# Patient Record
Sex: MALE | Race: WHITE | NOT HISPANIC OR LATINO | Employment: FULL TIME | URBAN - METROPOLITAN AREA
[De-identification: names, ages, dates, MRNs, and addresses within clinical notes are randomized per-mention and may not be internally consistent; named-entity substitution may affect disease eponyms.]

---

## 2017-03-01 ENCOUNTER — APPOINTMENT (OUTPATIENT)
Dept: CT IMAGING | Facility: CLINIC | Age: 27
End: 2017-03-01
Attending: EMERGENCY MEDICINE
Payer: COMMERCIAL

## 2017-03-01 ENCOUNTER — HOSPITAL ENCOUNTER (EMERGENCY)
Facility: CLINIC | Age: 27
Discharge: HOME | End: 2017-03-01
Attending: EMERGENCY MEDICINE | Admitting: EMERGENCY MEDICINE
Payer: COMMERCIAL

## 2017-03-01 VITALS
RESPIRATION RATE: 14 BRPM | DIASTOLIC BLOOD PRESSURE: 96 MMHG | OXYGEN SATURATION: 97 % | HEIGHT: 77 IN | WEIGHT: 270 LBS | TEMPERATURE: 98 F | BODY MASS INDEX: 31.88 KG/M2 | HEART RATE: 80 BPM | SYSTOLIC BLOOD PRESSURE: 160 MMHG

## 2017-03-01 DIAGNOSIS — S12.501A CLOSED NONDISPLACED FRACTURE OF SIXTH CERVICAL VERTEBRA, UNSPECIFIED FRACTURE MORPHOLOGY, INITIAL ENCOUNTER (H): ICD-10-CM

## 2017-03-01 DIAGNOSIS — I10 ESSENTIAL HYPERTENSION: ICD-10-CM

## 2017-03-01 LAB
ANION GAP SERPL CALCULATED.3IONS-SCNC: 8 MMOL/L (ref 3–14)
BASOPHILS # BLD AUTO: 0.1 10E9/L (ref 0–0.2)
BASOPHILS NFR BLD AUTO: 0.8 %
BUN SERPL-MCNC: 12 MG/DL (ref 7–30)
CALCIUM SERPL-MCNC: 9.5 MG/DL (ref 8.5–10.1)
CHLORIDE SERPL-SCNC: 104 MMOL/L (ref 94–109)
CO2 SERPL-SCNC: 27 MMOL/L (ref 20–32)
CREAT SERPL-MCNC: 0.71 MG/DL (ref 0.66–1.25)
DIFFERENTIAL METHOD BLD: ABNORMAL
EOSINOPHIL # BLD AUTO: 0.1 10E9/L (ref 0–0.7)
EOSINOPHIL NFR BLD AUTO: 1.6 %
ERYTHROCYTE [DISTWIDTH] IN BLOOD BY AUTOMATED COUNT: 13.6 % (ref 10–15)
GFR SERPL CREATININE-BSD FRML MDRD: NORMAL ML/MIN/1.7M2
GLUCOSE SERPL-MCNC: 94 MG/DL (ref 70–99)
HCT VFR BLD AUTO: 44.5 % (ref 40–53)
HGB BLD-MCNC: 15.4 G/DL (ref 13.3–17.7)
IMM GRANULOCYTES # BLD: 0 10E9/L (ref 0–0.4)
IMM GRANULOCYTES NFR BLD: 0.2 %
LYMPHOCYTES # BLD AUTO: 2.3 10E9/L (ref 0.8–5.3)
LYMPHOCYTES NFR BLD AUTO: 27 %
MCH RBC QN AUTO: 30 PG (ref 26.5–33)
MCHC RBC AUTO-ENTMCNC: 34.6 G/DL (ref 31.5–36.5)
MCV RBC AUTO: 87 FL (ref 78–100)
MONOCYTES # BLD AUTO: 1.4 10E9/L (ref 0–1.3)
MONOCYTES NFR BLD AUTO: 16.8 %
NEUTROPHILS # BLD AUTO: 4.5 10E9/L (ref 1.6–8.3)
NEUTROPHILS NFR BLD AUTO: 53.6 %
PLATELET # BLD AUTO: 270 10E9/L (ref 150–450)
POTASSIUM SERPL-SCNC: 3.9 MMOL/L (ref 3.4–5.3)
RBC # BLD AUTO: 5.13 10E12/L (ref 4.4–5.9)
SODIUM SERPL-SCNC: 139 MMOL/L (ref 133–144)
WBC # BLD AUTO: 8.4 10E9/L (ref 4–11)

## 2017-03-01 PROCEDURE — 25500064 ZZH RX 255 OP 636: Performed by: EMERGENCY MEDICINE

## 2017-03-01 PROCEDURE — 25000128 H RX IP 250 OP 636: Performed by: EMERGENCY MEDICINE

## 2017-03-01 PROCEDURE — 80048 BASIC METABOLIC PNL TOTAL CA: CPT | Performed by: EMERGENCY MEDICINE

## 2017-03-01 PROCEDURE — 72125 CT NECK SPINE W/O DYE: CPT

## 2017-03-01 PROCEDURE — 25000125 ZZHC RX 250: Performed by: EMERGENCY MEDICINE

## 2017-03-01 PROCEDURE — 96374 THER/PROPH/DIAG INJ IV PUSH: CPT | Mod: 59

## 2017-03-01 PROCEDURE — 70450 CT HEAD/BRAIN W/O DYE: CPT

## 2017-03-01 PROCEDURE — 70498 CT ANGIOGRAPHY NECK: CPT

## 2017-03-01 PROCEDURE — 85025 COMPLETE CBC W/AUTO DIFF WBC: CPT | Performed by: EMERGENCY MEDICINE

## 2017-03-01 PROCEDURE — 96375 TX/PRO/DX INJ NEW DRUG ADDON: CPT

## 2017-03-01 PROCEDURE — 99285 EMERGENCY DEPT VISIT HI MDM: CPT | Mod: 25

## 2017-03-01 RX ORDER — MORPHINE SULFATE 4 MG/ML
4 INJECTION, SOLUTION INTRAMUSCULAR; INTRAVENOUS
Status: COMPLETED | OUTPATIENT
Start: 2017-03-01 | End: 2017-03-01

## 2017-03-01 RX ORDER — METHYLPREDNISOLONE 4 MG
TABLET, DOSE PACK ORAL
Qty: 21 TABLET | Refills: 0 | Status: SHIPPED | OUTPATIENT
Start: 2017-03-01 | End: 2017-03-08

## 2017-03-01 RX ORDER — DIAZEPAM 10 MG/2ML
5 INJECTION, SOLUTION INTRAMUSCULAR; INTRAVENOUS ONCE
Status: COMPLETED | OUTPATIENT
Start: 2017-03-01 | End: 2017-03-01

## 2017-03-01 RX ORDER — CYCLOBENZAPRINE HCL 10 MG
10 TABLET ORAL 3 TIMES DAILY PRN
Qty: 20 TABLET | Refills: 0 | Status: SHIPPED | OUTPATIENT
Start: 2017-03-01 | End: 2017-03-08

## 2017-03-01 RX ORDER — IOPAMIDOL 755 MG/ML
70 INJECTION, SOLUTION INTRAVASCULAR ONCE
Status: COMPLETED | OUTPATIENT
Start: 2017-03-01 | End: 2017-03-01

## 2017-03-01 RX ORDER — OXYCODONE HYDROCHLORIDE 5 MG/1
5 TABLET ORAL EVERY 6 HOURS PRN
Qty: 20 TABLET | Refills: 0 | Status: SHIPPED | OUTPATIENT
Start: 2017-03-01 | End: 2017-03-08

## 2017-03-01 RX ORDER — METHYLPREDNISOLONE SODIUM SUCCINATE 125 MG/2ML
125 INJECTION, POWDER, LYOPHILIZED, FOR SOLUTION INTRAMUSCULAR; INTRAVENOUS ONCE
Status: COMPLETED | OUTPATIENT
Start: 2017-03-01 | End: 2017-03-01

## 2017-03-01 RX ORDER — OXYCODONE HYDROCHLORIDE 5 MG/1
10 TABLET ORAL ONCE
Status: DISCONTINUED | OUTPATIENT
Start: 2017-03-01 | End: 2017-03-01 | Stop reason: HOSPADM

## 2017-03-01 RX ADMIN — SODIUM CHLORIDE 100 ML: 9 INJECTION, SOLUTION INTRAVENOUS at 19:06

## 2017-03-01 RX ADMIN — MORPHINE SULFATE 4 MG: 4 INJECTION, SOLUTION INTRAMUSCULAR; INTRAVENOUS at 17:22

## 2017-03-01 RX ADMIN — DIAZEPAM 5 MG: 5 INJECTION, SOLUTION INTRAMUSCULAR; INTRAVENOUS at 17:53

## 2017-03-01 RX ADMIN — IOPAMIDOL 70 ML: 755 INJECTION, SOLUTION INTRAVENOUS at 19:06

## 2017-03-01 RX ADMIN — METHYLPREDNISOLONE SODIUM SUCCINATE 125 MG: 125 INJECTION, POWDER, FOR SOLUTION INTRAMUSCULAR; INTRAVENOUS at 17:22

## 2017-03-01 ASSESSMENT — ENCOUNTER SYMPTOMS
BACK PAIN: 1
WEAKNESS: 1
NUMBNESS: 1
NECK PAIN: 1

## 2017-03-01 NOTE — ED AVS SNAPSHOT
Emergency Department    [address] [facility] [address]    Phone:  [phone]    Fax:  [phone]                                       Tao Castillo   MRN: [id number]    Department:   Emergency Department   Date of Visit:  3/1/2017           Patient Information     Date Of Birth          1990        Your diagnoses for this visit were:     Closed nondisplaced fracture of sixth cervical vertebra, unspecified fracture morphology, initial encounter (H)     Essential hypertension        You were seen by Boogie White MD and Tayo Swift MD.      Follow-up Information     Follow up with Brian Nix MD In 1 week.    Specialty:  Neurosurgery    Contact information:    [facility]  [address] [facility] [address]  [facility] [address] [phone]          Follow up with  Emergency Department.    Specialty:  EMERGENCY MEDICINE    Why:  As needed, If symptoms worsen    Contact information:    [address] [facility] [address] [phone]        Follow up with Danny Camejo MD In 1 week.    Specialties:  Family Practice, Occupational Medicine    Contact information:    [facility]  [address] [facility] [address]  [phone]          Discharge Instructions         Neck or Spine Fractures (Broken Neck or Spine)  The spine stretches from the base of the skull to the tailbone. It's composed of 33 bones (vertebrae) that help support the body. These bones also protect the spinal cord, the important branch of your nervous system that carries messages from the brain to the body. A broken (fractured) bone in the neck or spine can be very serious. In some cases it can lead to paralysis or death. Emergency care is crucial.  Keep neck and spine injuries still  Do not move a person with a neck or spine injury. The person should lie still and wait for an emergency medical team. It can help for someone to gently  hold the person's head to keep it from moving until help arrives.   When to go to the emergency room (ER)  If you come upon a person with a neck or spine injury, call for emergency help right away. Wait for emergency services personnel to arrive and take over. A person with a neck or spinal injury may have these symptoms may include:    Unconsciousness    Severe back or neck pain    Bruising and swelling over the neck or back    Tingling or loss of feeling in the hands or feet    Loss of bowel or bladder function    Loss of feeling and movement below the level of injury    Weakness or inability to move arms or legs  What to expect in the ER  Here is what will happen in the ER:     The injured person may be placed on a spine board that prevents movement for examination.    X-rays of the neck and/or spine may be taken     A computed tomography (CT) scan or magnetic resonance imaging (MRI) scan may be done. These provide more detailed images of the structures in the neck and back.    Medicine may be given to lessen pain.  Treatment  The goal of treatment is to return the neck or spine to its normal position.    A minor neck fracture or simple spine fracture may be treated with a neck brace for 6 to 8 weeks until the bone heals.    Severe or complex fractures often need surgery. In that case, a bone specialist (orthopaedic surgeon) or nerve specialist (neurosurgeon) may be needed.    0804-4972 [employer]. [address] [place] [address]. All rights reserved. This information is not intended as a substitute for professional medical care. Always follow your healthcare professional's instructions.      Discharge Instructions  Hypertension - High Blood Pressure    During you visit to the Emergency Department, your blood pressure was higher than the recommended blood pressure.  This may be related to stress, pain, medication or other temporary conditions. In these cases, your blood pressure may  return to normal on its own. If you have a history of high blood pressure, you may need to have your doctor adjust your medications. Sometimes, your high measurement here may indicate that you have developed high blood pressure that will stay high unless it is treated. Sudden very high blood pressure can cause problems, but usually high blood pressure causes problems over months to years.      Blood pressure is almost never lowered in the Emergency Department, because studies have shown that lowering blood pressure too quickly is much more dangerous than leaving it alone.    You need to follow up with your doctor in 1-3 days to get your blood pressure rechecked.     Return to the Emergency Department if you start to have:    A severe headache.    Chest pain.    Shortness of breath.    Weakness or numbness that affects one part of the body.    Confusion.    Vision changes.    Significant swelling of legs and/or eyes.    A reaction to any medication started in the Emergency Department.    What can I do to help myself?    Avoid alcohol.    Take any blood pressure medicine that you are prescribed.    Get a good night s sleep.    Lower your salt intake.    Exercise.    Lose weight.    Manage stress.    If blood pressure medication was started in the Emergency Department:    The medicine may not have an immediate effect. The body and brain determine what blood pressure you have. The medicine s job is to retrain the body s  thermostat  to a lower blood pressure.    You will need to follow up with your doctor to see how this medicine is working for you.  If you were given a prescription for medicine here today, be sure to read all of the information (including the package insert) that comes with your prescription.  This will include important information about the medicine, its side effects, and any warnings that you need to know about.  The pharmacist who fills the prescription can provide more information and answer  questions you may have about the medicine.  If you have questions or concerns that the pharmacist cannot address, please call or return to the Emergency Department.   Opioid Medication Information    Pain medications are among the most commonly prescribed medicines, so we are including this information for all our patients. If you did not receive pain medication or get a prescription for pain medicine, you can ignore it.     You may have been given a prescription for an opioid (narcotic) pain medicine and/or have received a pain medicine while here in the Emergency Department. These medicines can make you drowsy or impaired. You must not drive, operate dangerous equipment, or engage in any other dangerous activities while taking these medications. If you drive while taking these medications, you could be arrested for DUI, or driving under the influence. Do not drink any alcohol while you are taking these medications.     Opioid pain medications can cause addiction. If you have a history of chemical dependency of any type, you are at a higher risk of becoming addicted to pain medications.  Only take these prescribed medications to treat your pain when all other options have been tried. Take it for as short a time and as few doses as possible. Store your pain pills in a secure place, as they are frequently stolen and provide a dangerous opportunity for children or visitors in your house to start abusing these powerful medications. We will not replace any lost or stolen medicine.  As soon as your pain is better, you should flush all your remaining medication.     Many prescription pain medications contain Tylenol  (acetaminophen), including Vicodin , Tylenol #3 , Norco , Lortab , and Percocet .  You should not take any extra pills of Tylenol  if you are using these prescription medications or you can get very sick.  Do not ever take more than 3000 mg of acetaminophen in any 24 hour period.    All opioids tend to cause  constipation. Drink plenty of water and eat foods that have a lot of fiber, such as fruits, vegetables, prune juice, apple juice and high fiber cereal.  Take a laxative if you don t move your bowels at least every other day. Miralax , Milk of Magnesia, Colace , or Senna  can be used to keep you regular.      Remember that you can always come back to the Emergency Department if you are not able to see your regular doctor in the amount of time listed above, if you get any new symptoms, or if there is anything that worries you.      24 Hour Appointment Hotline       To make an appointment at any [facility], call [phone] ([phone]). If you don't have a family doctor or clinic, we will help you find one. [facility] are conveniently located to serve the needs of you and your family.             Review of your medicines      START taking        Dose / Directions Last dose taken    cyclobenzaprine 10 MG tablet   Commonly known as:  FLEXERIL   Dose:  10 mg   Quantity:  20 tablet        Take 1 tablet (10 mg) by mouth 3 times daily as needed for muscle spasms   Refills:  0        methylPREDNISolone 4 MG tablet   Commonly known as:  MEDROL DOSEPAK   Quantity:  21 tablet        Follow package instructions   Refills:  0        oxyCODONE 5 MG IR tablet   Commonly known as:  ROXICODONE   Dose:  5 mg   Quantity:  20 tablet        Take 1 tablet (5 mg) by mouth every 6 hours as needed for pain   Refills:  0          Our records show that you are taking the medicines listed below. If these are incorrect, please call your family doctor or clinic.        Dose / Directions Last dose taken    atenolol 100 MG tablet   Commonly known as:  TENORMIN   Dose:  100 mg        Take 100 mg by mouth daily   Refills:  0        losartan 100 MG tablet   Commonly known as:  COZAAR   Dose:  100 mg        Take 100 mg by mouth daily   Refills:  0          STOP taking        Dose Reason for stopping Comments    ciprofloxacin 500 MG  tablet   Commonly known as:  CIPRO                      Prescriptions were sent or printed at these locations (3 Prescriptions)                   Other Prescriptions                Printed at Department/Unit printer (3 of 3)         oxyCODONE (ROXICODONE) 5 MG IR tablet               cyclobenzaprine (FLEXERIL) 10 MG tablet               methylPREDNISolone (MEDROL DOSEPAK) 4 MG tablet                Procedures and tests performed during your visit     Basic metabolic panel    CBC with platelets differential    CT Cervical Spine w/o Contrast    CT Head w/o Contrast    Neck angiogram CT w & w/o contrast      Orders Needing Specimen Collection     None      Pending Results     Date and Time Order Name Status Description    3/1/2017 1750 Neck angiogram CT w & w/o contrast In process     3/1/2017 1750 CT Cervical Spine w/o Contrast Preliminary     3/1/2017 1750 CT Head w/o Contrast Preliminary             Pending Culture Results     No orders found from 2/27/2017 to 3/2/2017.             Test Results from your hospital stay     3/1/2017  5:16 PM - Interface, PushCoin Results      Component Results     Component Value Ref Range & Units Status    WBC 8.4 4.0 - 11.0 10e9/L Final    RBC Count 5.13 4.4 - 5.9 10e12/L Final    Hemoglobin 15.4 13.3 - 17.7 g/dL Final    Hematocrit 44.5 40.0 - 53.0 % Final    MCV 87 78 - 100 fl Final    MCH 30.0 26.5 - 33.0 pg Final    MCHC 34.6 31.5 - 36.5 g/dL Final    RDW 13.6 10.0 - 15.0 % Final    Platelet Count 270 150 - 450 10e9/L Final    Diff Method Automated Method  Final    % Neutrophils 53.6 % Final    % Lymphocytes 27.0 % Final    % Monocytes 16.8 % Final    % Eosinophils 1.6 % Final    % Basophils 0.8 % Final    % Immature Granulocytes 0.2 % Final    Absolute Neutrophil 4.5 1.6 - 8.3 10e9/L Final    Absolute Lymphocytes 2.3 0.8 - 5.3 10e9/L Final    Absolute Monocytes 1.4 (H) 0.0 - 1.3 10e9/L Final    Absolute Eosinophils 0.1 0.0 - 0.7 10e9/L Final    Absolute Basophils 0.1 0.0 - 0.2  10e9/L Final    Abs Immature Granulocytes 0.0 0 - 0.4 10e9/L Final         3/1/2017  5:34 PM - Interface, Flexilab Results      Component Results     Component Value Ref Range & Units Status    Sodium 139 133 - 144 mmol/L Final    Potassium 3.9 3.4 - 5.3 mmol/L Final    Chloride 104 94 - 109 mmol/L Final    Carbon Dioxide 27 20 - 32 mmol/L Final    Anion Gap 8 3 - 14 mmol/L Final    Glucose 94 70 - 99 mg/dL Final    Urea Nitrogen 12 7 - 30 mg/dL Final    Creatinine 0.71 0.66 - 1.25 mg/dL Final    GFR Estimate >90  Non  GFR Calc   >60 mL/min/1.7m2 Final    GFR Estimate If Black >90   GFR Calc   >60 mL/min/1.7m2 Final    Calcium 9.5 8.5 - 10.1 mg/dL Final         3/1/2017  7:09 PM - Interface, Radiant Ib      Narrative     CT HEAD WITHOUT CONTRAST  3/1/2017 7:04 PM    HISTORY: Left-sided headache and neck pain after injury this weekend.  Prior aneurysm.    TECHNIQUE: Scans were obtained through the head without IV contrast.  Radiation dose for this scan was reduced using automated exposure  control, adjustment of the mA and/or kV according to patient size, or  iterative reconstruction technique.    COMPARISON: 5/13/2011 head CT.    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 This causes  some local artifact. No hemorrhage, mass lesion, or focal area of  acute infarction identified. Paranasal sinuses are normal. Prior right  temporal craniotomy for treatment of the aneurysm. Patient was  immediate postoperative at the time of the prior study and the  postoperative right-sided pneumocephalus has resolved in the interval.        Impression     IMPRESSION:   1. Prior aneurysm clipping as described.  2. Nothing acute.         3/1/2017  7:20 PM - Interface, Radiant Ib      Narrative     CT CERVICAL SPINE WITHOUT CONTRAST 3/1/2017 7:04 PM    HISTORY: Left-sided headache and neck pain after trauma. Prior  aneurysm.    COMPARISON: None.    TECHNIQUE: Routine CT cervical spine  through T1. Radiation dose for  this scan was reduced using automated exposure control, adjustment of  the mA and/or kV according to patient size, or iterative  reconstruction technique.    FINDINGS: Alignment is normal through T1. There is a linear lucency  running obliquely through the left C6 superior articular process on  sagittal series 7 image 63 and 64. There are no similar lucencies  elsewhere and this likely represents a nondisplaced fracture. This was  discussed with Dr. Swift by phone at 7:15 PM. The adjacent vertebral  artery appears intact on the CT angiogram preliminary view.    No degenerative changes in the cervical spine. Remainder of the exam  is normal.        Impression     IMPRESSION:   1. Apparent nondisplaced fracture of the left C6 superior articular  process.  2. [Critical Result: Left C6 superior articular process fracture.]    Finding was identified on 3/1/2017 7:08 PM.     Dr. Swift was contacted by me on 3/1/2017 7:17 PM and verbalized  understanding of the critical result.          3/1/2017  6:48 PM - Homer Collado      Result not yet available     Exam Ended                Clinical Quality Measure: Blood Pressure Screening     Your blood pressure was checked while you were in the emergency department today. The last reading we obtained was  BP: (!) 160/96 . Please read the guidelines below about what these numbers mean and what you should do about them.  If your systolic blood pressure (the top number) is less than 120 and your diastolic blood pressure (the bottom number) is less than 80, then your blood pressure is normal. There is nothing more that you need to do about it.  If your systolic blood pressure (the top number) is 120-139 or your diastolic blood pressure (the bottom number) is 80-89, your blood pressure may be higher than it should be. You should have your blood pressure rechecked within a year by a primary care provider.  If your systolic blood pressure (the top  "number) is 140 or greater or your diastolic blood pressure (the bottom number) is 90 or greater, you may have high blood pressure. High blood pressure is treatable, but if left untreated over time it can put you at risk for heart attack, stroke, or kidney failure. You should have your blood pressure rechecked by a primary care provider within the next 4 weeks.  If your provider in the emergency department today gave you specific instructions to follow-up with your doctor or provider even sooner than that, you should follow that instruction and not wait for up to 4 weeks for your follow-up visit.        Thank you for choosing [facility]       Thank you for choosing [facility] for your care. Our goal is always to provide you with excellent care. Hearing back from our patients is one way we can continue to improve our services. Please take a few minutes to complete the written survey that you may receive in the mail after you visit with us. Thank you!        [email or web address] Information     [employer] lets you send messages to your doctor, view your test results, renew your prescriptions, schedule appointments and more. To sign up, go to [email or web address] . Click on \"Log in\" on the left side of the screen, which will take you to the Welcome page. Then click on \"Sign up Now\" on the right side of the page.     You will be asked to enter the access code listed below, as well as some personal information. Please follow the directions to create your username and password.     Your access code is: [email or web address]  Expires: 2017  5:07 PM     Your access code will  in 90 days. If you need help or a new code, please call your [facility] clinic or [phone].        Care EveryWhere ID     This is your Care EveryWhere ID. This could be used by other organizations to access your [facility] medical records  [id number]        After Visit Summary       This is your record. Keep this with you and show to your community pharmacist(s) and " doctor(s) at your next visit.

## 2017-03-01 NOTE — LETTER
EMERGENCY DEPARTMENT  [address] [facility] [address]  [phone]    2017    Tao Castillo  [address] [facility] [address]  [phone] (home)     : 1990      To Whom it may concern:    Tao Castillo was seen in our Emergency Department today, 2017.  I expect his condition to improve over the next 7 days.  He may return to work/school when improved.    Sincerely,        Tayo Jackson Trigger
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
## 2017-03-01 NOTE — ED AVS SNAPSHOT
Emergency Department    [address] [place] [address]    Phone:  [phone]    Fax:  [phone]                                       Tao Castillo   MRN: [id number]    Department:   Emergency Department   Date of Visit:  3/1/2017           After Visit Summary Signature Page     I have received my discharge instructions, and my questions have been answered. I have discussed any challenges I see with this plan with the nurse or doctor.    ..........................................................................................................................................  Patient/Patient Representative Signature      ..........................................................................................................................................  Patient Representative Print Name and Relationship to Patient    ..................................................               ................................................  Date                                            Time    ..........................................................................................................................................  Reviewed by Signature/Title    ...................................................              ..............................................  Date                                                            Time

## 2017-03-02 NOTE — DISCHARGE INSTRUCTIONS
Neck or Spine Fractures (Broken Neck or Spine)  The spine stretches from the base of the skull to the tailbone. It's composed of 33 bones (vertebrae) that help support the body. These bones also protect the spinal cord, the important branch of your nervous system that carries messages from the brain to the body. A broken (fractured) bone in the neck or spine can be very serious. In some cases it can lead to paralysis or death. Emergency care is crucial.  Keep neck and spine injuries still  Do not move a person with a neck or spine injury. The person should lie still and wait for an emergency medical team. It can help for someone to gently hold the person's head to keep it from moving until help arrives.   When to go to the emergency room (ER)  If you come upon a person with a neck or spine injury, call for emergency help right away. Wait for emergency services personnel to arrive and take over. A person with a neck or spinal injury may have these symptoms may include:    Unconsciousness    Severe back or neck pain    Bruising and swelling over the neck or back    Tingling or loss of feeling in the hands or feet    Loss of bowel or bladder function    Loss of feeling and movement below the level of injury    Weakness or inability to move arms or legs  What to expect in the ER  Here is what will happen in the ER:     The injured person may be placed on a spine board that prevents movement for examination.    X-rays of the neck and/or spine may be taken     A computed tomography (CT) scan or magnetic resonance imaging (MRI) scan may be done. These provide more detailed images of the structures in the neck and back.    Medicine may be given to lessen pain.  Treatment  The goal of treatment is to return the neck or spine to its normal position.    A minor neck fracture or simple spine fracture may be treated with a neck brace for 6 to 8 weeks until the bone heals.    Severe or complex fractures often need surgery. In  that case, a bone specialist (orthopaedic surgeon) or nerve specialist (neurosurgeon) may be needed.    8026-5737 [employer]. [address] [place] [address], [place] [address]. All rights reserved. This information is not intended as a substitute for professional medical care. Always follow your healthcare professional's instructions.      Discharge Instructions  Hypertension - High Blood Pressure    During you visit to the Emergency Department, your blood pressure was higher than the recommended blood pressure.  This may be related to stress, pain, medication or other temporary conditions. In these cases, your blood pressure may return to normal on its own. If you have a history of high blood pressure, you may need to have your doctor adjust your medications. Sometimes, your high measurement here may indicate that you have developed high blood pressure that will stay high unless it is treated. Sudden very high blood pressure can cause problems, but usually high blood pressure causes problems over months to years.      Blood pressure is almost never lowered in the Emergency Department, because studies have shown that lowering blood pressure too quickly is much more dangerous than leaving it alone.    You need to follow up with your doctor in 1-3 days to get your blood pressure rechecked.     Return to the Emergency Department if you start to have:    A severe headache.    Chest pain.    Shortness of breath.    Weakness or numbness that affects one part of the body.    Confusion.    Vision changes.    Significant swelling of legs and/or eyes.    A reaction to any medication started in the Emergency Department.    What can I do to help myself?    Avoid alcohol.    Take any blood pressure medicine that you are prescribed.    Get a good night s sleep.    Lower your salt intake.    Exercise.    Lose weight.    Manage stress.    If blood pressure medication was started in the Emergency Department:    The medicine  may not have an immediate effect. The body and brain determine what blood pressure you have. The medicine s job is to retrain the body s  thermostat  to a lower blood pressure.    You will need to follow up with your doctor to see how this medicine is working for you.  If you were given a prescription for medicine here today, be sure to read all of the information (including the package insert) that comes with your prescription.  This will include important information about the medicine, its side effects, and any warnings that you need to know about.  The pharmacist who fills the prescription can provide more information and answer questions you may have about the medicine.  If you have questions or concerns that the pharmacist cannot address, please call or return to the Emergency Department.   Opioid Medication Information    Pain medications are among the most commonly prescribed medicines, so we are including this information for all our patients. If you did not receive pain medication or get a prescription for pain medicine, you can ignore it.     You may have been given a prescription for an opioid (narcotic) pain medicine and/or have received a pain medicine while here in the Emergency Department. These medicines can make you drowsy or impaired. You must not drive, operate dangerous equipment, or engage in any other dangerous activities while taking these medications. If you drive while taking these medications, you could be arrested for DUI, or driving under the influence. Do not drink any alcohol while you are taking these medications.     Opioid pain medications can cause addiction. If you have a history of chemical dependency of any type, you are at a higher risk of becoming addicted to pain medications.  Only take these prescribed medications to treat your pain when all other options have been tried. Take it for as short a time and as few doses as possible. Store your pain pills in a secure place, as  they are frequently stolen and provide a dangerous opportunity for children or visitors in your house to start abusing these powerful medications. We will not replace any lost or stolen medicine.  As soon as your pain is better, you should flush all your remaining medication.     Many prescription pain medications contain Tylenol  (acetaminophen), including Vicodin , Tylenol #3 , Norco , Lortab , and Percocet .  You should not take any extra pills of Tylenol  if you are using these prescription medications or you can get very sick.  Do not ever take more than 3000 mg of acetaminophen in any 24 hour period.    All opioids tend to cause constipation. Drink plenty of water and eat foods that have a lot of fiber, such as fruits, vegetables, prune juice, apple juice and high fiber cereal.  Take a laxative if you don t move your bowels at least every other day. Miralax , Milk of Magnesia, Colace , or Senna  can be used to keep you regular.      Remember that you can always come back to the Emergency Department if you are not able to see your regular doctor in the amount of time listed above, if you get any new symptoms, or if there is anything that worries you.

## 2017-03-08 ENCOUNTER — APPOINTMENT (OUTPATIENT)
Dept: CT IMAGING | Facility: CLINIC | Age: 27
DRG: 897 | End: 2017-03-08
Payer: COMMERCIAL

## 2017-03-08 ENCOUNTER — HOSPITAL ENCOUNTER (INPATIENT)
Facility: CLINIC | Age: 27
LOS: 2 days | Discharge: ANOTHER HOSPITAL | DRG: 897 | End: 2017-03-10
Attending: EMERGENCY MEDICINE | Admitting: INTERNAL MEDICINE
Payer: COMMERCIAL

## 2017-03-08 DIAGNOSIS — F10.930 ALCOHOL WITHDRAWAL, UNCOMPLICATED (H): ICD-10-CM

## 2017-03-08 DIAGNOSIS — I16.0 HYPERTENSIVE URGENCY: ICD-10-CM

## 2017-03-08 DIAGNOSIS — S12.500D: ICD-10-CM

## 2017-03-08 DIAGNOSIS — S12.500S: Primary | ICD-10-CM

## 2017-03-08 PROBLEM — F10.931 SEVERE ALCOHOL WITHDRAWAL DELIRIUM (H): Status: ACTIVE | Noted: 2017-03-08

## 2017-03-08 LAB
ALBUMIN SERPL-MCNC: 3.8 G/DL (ref 3.4–5)
ALCOHOL BREATH TEST: 0.06 (ref 0–0.01)
ALP SERPL-CCNC: 60 U/L (ref 40–150)
ALT SERPL W P-5'-P-CCNC: 51 U/L (ref 0–70)
AMPHETAMINES UR QL SCN: ABNORMAL
ANION GAP SERPL CALCULATED.3IONS-SCNC: 9 MMOL/L (ref 3–14)
AST SERPL W P-5'-P-CCNC: 44 U/L (ref 0–45)
BARBITURATES UR QL: ABNORMAL
BENZODIAZ UR QL: ABNORMAL
BILIRUB SERPL-MCNC: 0.6 MG/DL (ref 0.2–1.3)
BUN SERPL-MCNC: 12 MG/DL (ref 7–30)
CALCIUM SERPL-MCNC: 8.8 MG/DL (ref 8.5–10.1)
CANNABINOIDS UR QL SCN: ABNORMAL
CHLORIDE SERPL-SCNC: 103 MMOL/L (ref 94–109)
CO2 SERPL-SCNC: 24 MMOL/L (ref 20–32)
COCAINE UR QL: ABNORMAL
CREAT SERPL-MCNC: 0.59 MG/DL (ref 0.66–1.25)
ETHANOL UR QL SCN: ABNORMAL
GFR SERPL CREATININE-BSD FRML MDRD: ABNORMAL ML/MIN/1.7M2
GLUCOSE BLDC GLUCOMTR-MCNC: 88 MG/DL (ref 70–99)
GLUCOSE SERPL-MCNC: 77 MG/DL (ref 70–99)
MAGNESIUM SERPL-MCNC: 2.1 MG/DL (ref 1.6–2.3)
OPIATES UR QL SCN: ABNORMAL
PCP UR QL SCN: ABNORMAL
PHOSPHATE SERPL-MCNC: 3.5 MG/DL (ref 2.5–4.5)
POTASSIUM SERPL-SCNC: 3.8 MMOL/L (ref 3.4–5.3)
PROT SERPL-MCNC: 8.3 G/DL (ref 6.8–8.8)
SODIUM SERPL-SCNC: 136 MMOL/L (ref 133–144)

## 2017-03-08 PROCEDURE — 25000132 ZZH RX MED GY IP 250 OP 250 PS 637: Performed by: EMERGENCY MEDICINE

## 2017-03-08 PROCEDURE — 96374 THER/PROPH/DIAG INJ IV PUSH: CPT | Performed by: EMERGENCY MEDICINE

## 2017-03-08 PROCEDURE — 25000128 H RX IP 250 OP 636: Performed by: STUDENT IN AN ORGANIZED HEALTH CARE EDUCATION/TRAINING PROGRAM

## 2017-03-08 PROCEDURE — 99285 EMERGENCY DEPT VISIT HI MDM: CPT | Performed by: EMERGENCY MEDICINE

## 2017-03-08 PROCEDURE — 82075 ASSAY OF BREATH ETHANOL: CPT | Performed by: EMERGENCY MEDICINE

## 2017-03-08 PROCEDURE — 84100 ASSAY OF PHOSPHORUS: CPT | Performed by: STUDENT IN AN ORGANIZED HEALTH CARE EDUCATION/TRAINING PROGRAM

## 2017-03-08 PROCEDURE — 80307 DRUG TEST PRSMV CHEM ANLYZR: CPT | Performed by: STUDENT IN AN ORGANIZED HEALTH CARE EDUCATION/TRAINING PROGRAM

## 2017-03-08 PROCEDURE — 00000146 ZZHCL STATISTIC GLUCOSE BY METER IP

## 2017-03-08 PROCEDURE — 83735 ASSAY OF MAGNESIUM: CPT | Performed by: STUDENT IN AN ORGANIZED HEALTH CARE EDUCATION/TRAINING PROGRAM

## 2017-03-08 PROCEDURE — 80053 COMPREHEN METABOLIC PANEL: CPT | Performed by: STUDENT IN AN ORGANIZED HEALTH CARE EDUCATION/TRAINING PROGRAM

## 2017-03-08 PROCEDURE — 25000132 ZZH RX MED GY IP 250 OP 250 PS 637: Performed by: INTERNAL MEDICINE

## 2017-03-08 PROCEDURE — 12000001 ZZH R&B MED SURG/OB UMMC

## 2017-03-08 PROCEDURE — 99285 EMERGENCY DEPT VISIT HI MDM: CPT | Mod: Z6 | Performed by: EMERGENCY MEDICINE

## 2017-03-08 PROCEDURE — 25000132 ZZH RX MED GY IP 250 OP 250 PS 637: Performed by: STUDENT IN AN ORGANIZED HEALTH CARE EDUCATION/TRAINING PROGRAM

## 2017-03-08 PROCEDURE — 96361 HYDRATE IV INFUSION ADD-ON: CPT | Performed by: EMERGENCY MEDICINE

## 2017-03-08 PROCEDURE — 25000128 H RX IP 250 OP 636: Performed by: EMERGENCY MEDICINE

## 2017-03-08 PROCEDURE — 25000125 ZZHC RX 250: Performed by: INTERNAL MEDICINE

## 2017-03-08 PROCEDURE — HZ2ZZZZ DETOXIFICATION SERVICES FOR SUBSTANCE ABUSE TREATMENT: ICD-10-PCS | Performed by: INTERNAL MEDICINE

## 2017-03-08 PROCEDURE — 80320 DRUG SCREEN QUANTALCOHOLS: CPT | Performed by: STUDENT IN AN ORGANIZED HEALTH CARE EDUCATION/TRAINING PROGRAM

## 2017-03-08 PROCEDURE — 72125 CT NECK SPINE W/O DYE: CPT

## 2017-03-08 PROCEDURE — 99207 ZZC MOONLIGHTING INDICATOR: CPT | Performed by: INTERNAL MEDICINE

## 2017-03-08 RX ORDER — OXYCODONE HCL 10 MG/1
10 TABLET, FILM COATED, EXTENDED RELEASE ORAL ONCE
Status: DISCONTINUED | OUTPATIENT
Start: 2017-03-08 | End: 2017-03-08

## 2017-03-08 RX ORDER — ACETAMINOPHEN 325 MG/1
650 TABLET ORAL EVERY 4 HOURS PRN
Status: DISCONTINUED | OUTPATIENT
Start: 2017-03-08 | End: 2017-03-10 | Stop reason: HOSPADM

## 2017-03-08 RX ORDER — ONDANSETRON 2 MG/ML
4 INJECTION INTRAMUSCULAR; INTRAVENOUS EVERY 6 HOURS PRN
Status: DISCONTINUED | OUTPATIENT
Start: 2017-03-08 | End: 2017-03-10 | Stop reason: HOSPADM

## 2017-03-08 RX ORDER — POTASSIUM CHLORIDE 750 MG/1
20-40 TABLET, EXTENDED RELEASE ORAL
Status: DISCONTINUED | OUTPATIENT
Start: 2017-03-08 | End: 2017-03-10 | Stop reason: HOSPADM

## 2017-03-08 RX ORDER — OXYCODONE HYDROCHLORIDE 5 MG/1
10 TABLET ORAL ONCE
Status: COMPLETED | OUTPATIENT
Start: 2017-03-08 | End: 2017-03-08

## 2017-03-08 RX ORDER — POTASSIUM CHLORIDE 7.45 MG/ML
10 INJECTION INTRAVENOUS
Status: DISCONTINUED | OUTPATIENT
Start: 2017-03-08 | End: 2017-03-10 | Stop reason: HOSPADM

## 2017-03-08 RX ORDER — MULTIPLE VITAMINS W/ MINERALS TAB 9MG-400MCG
1 TAB ORAL DAILY
Status: DISCONTINUED | OUTPATIENT
Start: 2017-03-08 | End: 2017-03-08

## 2017-03-08 RX ORDER — ONDANSETRON 4 MG/1
4 TABLET, ORALLY DISINTEGRATING ORAL EVERY 6 HOURS PRN
Status: DISCONTINUED | OUTPATIENT
Start: 2017-03-08 | End: 2017-03-10 | Stop reason: HOSPADM

## 2017-03-08 RX ORDER — BISACODYL 5 MG
5-15 TABLET, DELAYED RELEASE (ENTERIC COATED) ORAL DAILY PRN
Status: DISCONTINUED | OUTPATIENT
Start: 2017-03-08 | End: 2017-03-10 | Stop reason: HOSPADM

## 2017-03-08 RX ORDER — NICOTINE 21 MG/24HR
1 PATCH, TRANSDERMAL 24 HOURS TRANSDERMAL DAILY
Status: DISCONTINUED | OUTPATIENT
Start: 2017-03-09 | End: 2017-03-10 | Stop reason: HOSPADM

## 2017-03-08 RX ORDER — DIAZEPAM 5 MG
5-20 TABLET ORAL EVERY 30 MIN PRN
Status: DISCONTINUED | OUTPATIENT
Start: 2017-03-08 | End: 2017-03-10 | Stop reason: HOSPADM

## 2017-03-08 RX ORDER — LANOLIN ALCOHOL/MO/W.PET/CERES
100 CREAM (GRAM) TOPICAL DAILY
Status: DISCONTINUED | OUTPATIENT
Start: 2017-03-08 | End: 2017-03-08

## 2017-03-08 RX ORDER — MULTIPLE VITAMINS W/ MINERALS TAB 9MG-400MCG
1 TAB ORAL DAILY
Status: DISCONTINUED | OUTPATIENT
Start: 2017-03-09 | End: 2017-03-10 | Stop reason: HOSPADM

## 2017-03-08 RX ORDER — POTASSIUM CHLORIDE 29.8 MG/ML
20 INJECTION INTRAVENOUS
Status: DISCONTINUED | OUTPATIENT
Start: 2017-03-08 | End: 2017-03-10 | Stop reason: HOSPADM

## 2017-03-08 RX ORDER — IBUPROFEN 200 MG
200 TABLET ORAL EVERY 6 HOURS PRN
Status: DISCONTINUED | OUTPATIENT
Start: 2017-03-08 | End: 2017-03-08

## 2017-03-08 RX ORDER — LANOLIN ALCOHOL/MO/W.PET/CERES
100 CREAM (GRAM) TOPICAL DAILY
Status: DISCONTINUED | OUTPATIENT
Start: 2017-03-09 | End: 2017-03-10 | Stop reason: HOSPADM

## 2017-03-08 RX ORDER — LORAZEPAM 2 MG/ML
1 INJECTION INTRAMUSCULAR ONCE
Status: COMPLETED | OUTPATIENT
Start: 2017-03-08 | End: 2017-03-08

## 2017-03-08 RX ORDER — IBUPROFEN 200 MG
400 TABLET ORAL EVERY 6 HOURS PRN
Status: DISCONTINUED | OUTPATIENT
Start: 2017-03-08 | End: 2017-03-08

## 2017-03-08 RX ORDER — DEXTROSE MONOHYDRATE, SODIUM CHLORIDE, AND POTASSIUM CHLORIDE 50; 1.49; 9 G/1000ML; G/1000ML; G/1000ML
INJECTION, SOLUTION INTRAVENOUS CONTINUOUS
Status: DISCONTINUED | OUTPATIENT
Start: 2017-03-08 | End: 2017-03-10 | Stop reason: HOSPADM

## 2017-03-08 RX ORDER — POTASSIUM CHLORIDE 1.5 G/1.58G
20-40 POWDER, FOR SOLUTION ORAL
Status: DISCONTINUED | OUTPATIENT
Start: 2017-03-08 | End: 2017-03-10 | Stop reason: HOSPADM

## 2017-03-08 RX ORDER — NALOXONE HYDROCHLORIDE 0.4 MG/ML
.1-.4 INJECTION, SOLUTION INTRAMUSCULAR; INTRAVENOUS; SUBCUTANEOUS
Status: DISCONTINUED | OUTPATIENT
Start: 2017-03-08 | End: 2017-03-10 | Stop reason: HOSPADM

## 2017-03-08 RX ORDER — OXYCODONE HYDROCHLORIDE 5 MG/1
5-10 TABLET ORAL
Status: DISCONTINUED | OUTPATIENT
Start: 2017-03-08 | End: 2017-03-10 | Stop reason: HOSPADM

## 2017-03-08 RX ORDER — FOLIC ACID 1 MG/1
1 TABLET ORAL DAILY
Status: DISCONTINUED | OUTPATIENT
Start: 2017-03-08 | End: 2017-03-08

## 2017-03-08 RX ORDER — FOLIC ACID 1 MG/1
1 TABLET ORAL DAILY
Status: DISCONTINUED | OUTPATIENT
Start: 2017-03-09 | End: 2017-03-10 | Stop reason: HOSPADM

## 2017-03-08 RX ORDER — MAGNESIUM SULFATE HEPTAHYDRATE 40 MG/ML
4 INJECTION, SOLUTION INTRAVENOUS EVERY 4 HOURS PRN
Status: DISCONTINUED | OUTPATIENT
Start: 2017-03-08 | End: 2017-03-10 | Stop reason: HOSPADM

## 2017-03-08 RX ORDER — CYCLOBENZAPRINE HCL 10 MG
10 TABLET ORAL 3 TIMES DAILY
Status: DISCONTINUED | OUTPATIENT
Start: 2017-03-08 | End: 2017-03-10 | Stop reason: HOSPADM

## 2017-03-08 RX ORDER — ATENOLOL 50 MG/1
50 TABLET ORAL 2 TIMES DAILY PRN
Status: DISCONTINUED | OUTPATIENT
Start: 2017-03-08 | End: 2017-03-10 | Stop reason: HOSPADM

## 2017-03-08 RX ORDER — LORAZEPAM 1 MG/1
1-4 TABLET ORAL EVERY 30 MIN PRN
Status: DISCONTINUED | OUTPATIENT
Start: 2017-03-08 | End: 2017-03-08

## 2017-03-08 RX ADMIN — SODIUM CHLORIDE 1000 ML: 9 INJECTION, SOLUTION INTRAVENOUS at 17:28

## 2017-03-08 RX ADMIN — MULTIPLE VITAMINS W/ MINERALS TAB 1 TABLET: TAB at 15:39

## 2017-03-08 RX ADMIN — CYCLOBENZAPRINE HYDROCHLORIDE 10 MG: 10 TABLET, FILM COATED ORAL at 22:12

## 2017-03-08 RX ADMIN — LORAZEPAM 2 MG: 1 TABLET ORAL at 15:15

## 2017-03-08 RX ADMIN — IBUPROFEN 200 MG: 200 TABLET, FILM COATED ORAL at 15:39

## 2017-03-08 RX ADMIN — FOLIC ACID: 5 INJECTION, SOLUTION INTRAMUSCULAR; INTRAVENOUS; SUBCUTANEOUS at 22:12

## 2017-03-08 RX ADMIN — DIAZEPAM 10 MG: 5 TABLET ORAL at 22:12

## 2017-03-08 RX ADMIN — FOLIC ACID 1 MG: 1 TABLET ORAL at 15:39

## 2017-03-08 RX ADMIN — SODIUM CHLORIDE 1000 ML: 9 INJECTION, SOLUTION INTRAVENOUS at 19:06

## 2017-03-08 RX ADMIN — LORAZEPAM 3 MG: 1 TABLET ORAL at 19:29

## 2017-03-08 RX ADMIN — LORAZEPAM 1 MG: 2 INJECTION INTRAMUSCULAR; INTRAVENOUS at 20:35

## 2017-03-08 RX ADMIN — OXYCODONE HYDROCHLORIDE 10 MG: 5 TABLET ORAL at 22:12

## 2017-03-08 RX ADMIN — OXYCODONE HYDROCHLORIDE 10 MG: 5 TABLET ORAL at 17:28

## 2017-03-08 RX ADMIN — LORAZEPAM 2 MG: 1 TABLET ORAL at 17:08

## 2017-03-08 RX ADMIN — Medication 100 MG: at 15:39

## 2017-03-08 ASSESSMENT — ENCOUNTER SYMPTOMS
CONFUSION: 0
ABDOMINAL PAIN: 0
DIARRHEA: 0
NUMBNESS: 1
DIAPHORESIS: 1
SHORTNESS OF BREATH: 0
CONSTIPATION: 0
ARTHRALGIAS: 0
EYE REDNESS: 0
SPEECH DIFFICULTY: 0
HEADACHES: 0
NECK STIFFNESS: 0
HEMATURIA: 0
CHILLS: 0
NAUSEA: 1
WEAKNESS: 0
TREMORS: 1
VOMITING: 0
ABDOMINAL DISTENTION: 0
COLOR CHANGE: 0
BLOOD IN STOOL: 0
DIZZINESS: 1
DIFFICULTY URINATING: 0
SEIZURES: 0
FEVER: 0

## 2017-03-08 NOTE — ED PROVIDER NOTES
"  History     Chief Complaint   Patient presents with     Addiction Problem     Pt c/o withdrawl from alcohol. Normally drinks 0.75 of vodka. Last drink at 1900. Shaking this am. Has had one seizure for withdrawl in November. Not looking for detox.      Neck Injury     Pt c/o neck injury 1.5 weeks ago after wrestling with friend. Pt notes it is not improving.Has been wearing an [place] c-collar. \"The whole left side of my body is numb since the accident and has been seen for this at [facility]\". \"I fractured my neck, c-6\". Not worse since then, but is not getting better.      HPI  Tao Castillo is a 26 year old male with significant PMH of clipped cerebrovascular aneurism, alcoholism, etoh W/D seizure and recent C6 spine fracture who presented to ED with symptoms of Etoh W/D and left sided numbness and tingling.     Patient has a 10 years history of Etoh use , drinking .075 vodka daily and has had numerous W/D experiences in the past, one in November that lead to seizure. His last use was yesterday at 1900 when he drank so much and passed out. When he woke up he felt like \"dizzy, shaky and crappy\" but this time he decided not to drink. He is willing to stop the alcohol use.     Of note, he had a neck injury on 3/1 when he was wrestling with his friend and broke his C6. Then he presented to [facility] with great deal of pain and left sided numbness. Cervical CT showed fx and he was provided with hydrocodone and collar with the plan to follow up today. He ran out of pain med 3 days ago and since then has been drinking to self medicate for pain.     Regarding the clipped cerebrovascular aneurism he reports he had a severe headache 6 years ago and he was diagnosed with \"leaky aneurism\" and was operated immediately.     Past Medical History   Diagnosis Date     Alcohol dependence (H)      Aneurysm (H) 5/11     [facility] sees neuro Dr. Latham     BMI 34.0-34.9,adult      CARDIOVASCULAR SCREENING; LDL GOAL LESS THAN 160      HTN " (hypertension)      Marijuana dependence (H)      Polysubstance abuse        Past Surgical History   Procedure Laterality Date     Craniotomy  5/11     rt frontal orbital-clipping of acom aneurysm       Family History   Problem Relation Age of Onset     Asthma Father        Social History   Substance Use Topics     Smoking status: Current Every Day Smoker     Packs/day: 0.50     Years: 5.00     Types: Cigarettes     Last attempt to quit: 5/15/2009     Smokeless tobacco: Never Used     Alcohol use Yes      Comment: daily .75 liter       I have reviewed the Medications, Allergies, Past Medical and Surgical History, and Social History in the Epic system.    Review of Systems   Constitutional: Positive for diaphoresis. Negative for chills and fever.   HENT: Positive for dental problem. Negative for congestion.    Eyes: Negative for redness.   Respiratory: Negative for shortness of breath.    Cardiovascular: Negative for chest pain.   Gastrointestinal: Positive for nausea. Negative for abdominal distention, abdominal pain, blood in stool, constipation, diarrhea and vomiting.   Genitourinary: Negative for difficulty urinating and hematuria.        Dark urine   Musculoskeletal: Negative for arthralgias and neck stiffness.   Skin: Negative for color change.   Allergic/Immunologic: Negative for food allergies.   Neurological: Positive for dizziness, tremors and numbness. Negative for seizures, syncope, speech difficulty, weakness and headaches.   Psychiatric/Behavioral: Negative for confusion.       Physical Exam   BP: (!) 167/104  Pulse: 116  Heart Rate: 101  Temp: 98.7  F (37.1  C)  Resp: 20  Weight: 124.9 kg (275 lb 6 oz)  SpO2: 95 %  Physical Exam   Constitutional: He is oriented to person, place, and time.   Diaphoretic, smells like alcohol     HENT:   Head: Atraumatic.   Eyes: EOM are normal. Pupils are equal, round, and reactive to light.   Cardiovascular: Regular rhythm and normal heart sounds.    Pulmonary/Chest:  Breath sounds normal. No respiratory distress. He exhibits no tenderness.   Abdominal: Soft. Bowel sounds are normal. There is no tenderness.   Musculoskeletal: Normal range of motion. He exhibits tenderness.        Cervical back: He exhibits tenderness.        Thoracic back: He exhibits no tenderness.        Lumbar back: He exhibits no tenderness.   Cervical spine point tenderness and increasing pain with elevation of left upper extrimity. Pain radiates to left hand. No hand weakness, 4/5 biceps /triceps weakness due to pain?   Neurological: He is alert and oriented to person, place, and time. He displays normal reflexes. No cranial nerve deficit. He exhibits normal muscle tone. Coordination normal.   Tremor noted.     Skin: No abrasion and no laceration noted. He is not diaphoretic.     Past Medical History   Diagnosis Date     Alcohol dependence (H)      Aneurysm (H) 5/11     [facility]. sees neuro Dr. Latham     BMI 34.0-34.9,adult      CARDIOVASCULAR SCREENING; LDL GOAL LESS THAN 160      HTN (hypertension)      Marijuana dependence (H)      Polysubstance abuse        Past Surgical History   Procedure Laterality Date     Craniotomy  5/11     rt frontal orbital-clipping of acom aneurysm       Family History   Problem Relation Age of Onset     Asthma Father        Social History   Substance Use Topics     Smoking status: Current Every Day Smoker     Packs/day: 0.50     Years: 5.00     Types: Cigarettes     Last attempt to quit: 5/15/2009     Smokeless tobacco: Never Used     Alcohol use Yes      Comment: daily .75 liter     MED:  Losartan and methyl prednisolone were in the system however patient declined taking any medication.    Allergy: NKDA    Results for orders placed or performed during the hospital encounter of 03/08/17   Cervical spine CT w/o contrast    Narrative    CT CERVICAL SPINE WITHOUT CONTRAST   3/8/2017 6:28 PM     HISTORY: Left C6 articular process fracture. Recurrent pain.    TECHNIQUE: Axial images  of the cervical spine were obtained without  intravenous contrast. Multiplanar reformations were performed.  Radiation dose for this scan was reduced using automated exposure  control, adjustment of the mA and/or kV according to patient size, or  iterative reconstruction technique.     COMPARISON: 3/1/2017.    FINDINGS: Sagittal images demonstrate normal posterior alignment.  Again seen is a nondisplaced fracture through the superior articular  process of the left C6 facet. It is unchanged in position. There is no  evidence for healing at this time. No new fractures are present.    C2-C3: Normal.    C3-C4: Normal.    C4-C5: Normal.    C5-C6: No stenosis. Nondisplaced left superior articular process  fracture noted.    C6-C7: Normal.    C7-T1: Normal.    Paraspinal soft tissues: Unremarkable as visualized. There is no  evidence for epidural hematoma on this study.      Impression    IMPRESSION:  1. No change in nondisplaced left C6 superior articular process  fracture.  2. There is no evidence for any malalignment, stenosis, or epidural  hematoma. If clinical symptoms persist or progress, MRI can be more  sensitive in detecting subtle disc protrusions or subtle hemorrhages.   Comprehensive metabolic panel   Result Value Ref Range    Sodium 136 133 - 144 mmol/L    Potassium 3.8 3.4 - 5.3 mmol/L    Chloride 103 94 - 109 mmol/L    Carbon Dioxide 24 20 - 32 mmol/L    Anion Gap 9 3 - 14 mmol/L    Glucose 77 70 - 99 mg/dL    Urea Nitrogen 12 7 - 30 mg/dL    Creatinine 0.59 (L) 0.66 - 1.25 mg/dL    GFR Estimate >90  Non  GFR Calc   >60 mL/min/1.7m2    GFR Estimate If Black >90   GFR Calc   >60 mL/min/1.7m2    Calcium 8.8 8.5 - 10.1 mg/dL    Bilirubin Total 0.6 0.2 - 1.3 mg/dL    Albumin 3.8 3.4 - 5.0 g/dL    Protein Total 8.3 6.8 - 8.8 g/dL    Alkaline Phosphatase 60 40 - 150 U/L    ALT 51 0 - 70 U/L    AST 44 0 - 45 U/L   Glucose by meter   Result Value Ref Range    Glucose 88 70 - 99 mg/dL  "  Alcohol breath test POCT   Result Value Ref Range    Alcohol Breath Test 0.06 (A) 0.00 - 0.01       ED Course     ED Course   FSBG was 88. CMP was unremarkable. Patient still complained of pain. Oxycodone was provided with partial relief of symptoms. 2 doses of Ativan provided for scoring on MSSA.     Of note: the ACOM aneurism clip was placed 6 years ago. The kind is #5 Sugiuta type and the following was obtained from the instructions for the clip: \"Sugita clips have been shown to be acceptable for use with MRI systems, of up to 1.5 [place], presently in clinical use.\" obtained from: http://[email or web address]/30/sugita_elgiloy_clips_brochure.pdf    Neuroradiology recommended CT w/o contrast. CT of the neck w/o contrast did not show any change from the one taken on 3/1. Medically stable neck fracture.      Procedures             Critical Care time:  none      Labs Ordered and Resulted from Time of ED Arrival Up to the Time of Departure from the ED   COMPREHENSIVE METABOLIC PANEL - Abnormal; Notable for the following:        Result Value    Creatinine 0.59 (*)     All other components within normal limits   ALCOHOL BREATH TEST POCT - Abnormal; Notable for the following:     Alcohol Breath Test 0.06 (*)     All other components within normal limits   GLUCOSE MONITOR NURSING POCT   GLUCOSE BY METER   DRUG ABUSE SCREEN 8 URINE (UR)   MSSA SCORE AND VS       Assessments & Plan (with Medical Decision Making)   26 years old patient with PMH of clipped right sided cerebrovascular aneurism, alcoholism, etoh W/D seizure and recent C6 spine fracture who was presented with alcohol withdrawal symptoms and left sided numbness and tingling. Differential diagnosis for left sided numbness and tingling included cervical fracture complications including dislocation / hematoma/ nerve impingement, clipped cerebrovasvcular aneurism sequela including hemorrhagic stroke, simple radiculopathy, and B12 def in grounds of prolonged alcohol " use.     # Alcohol withdrawal: Long history of alcohol use do with one episode of withdrawal seizure (tonic clonic) on November. Diaphoresis, dizziness and tremor few hours after discontinuation of Etoh are consistent with diagnosis of Etoh WD. At ED,  BAL : 0.06. Patient also has not been eating or drinking in the past 20 hours with reported dark urine.   - Utox, CMP  - MSSA protocol with Lorazepam  - Thiamine, folic acid and multivitamin  - Fingerstick Blood glucose once  - IVF: 1 litre NS once bolus  - May consider Vit B12 level due to prolonged alcohol use.  - May consider CD assessment  - May consider medicine consult for pain management    #Cervical fracture: Patient suffered a traumatic cervical injury on 3/1 and was diagnosed with non dislocated C6 fracture. Left sided numbness and tingling were the symptoms that initially appeared after the trauma and reportedly has not improved in the past week. Unlikely to be stroke/ complications of brain aneurism or simple radiculopathy due to quality of symptoms and timing of presentation post trauma. There are point tenderness at cervical spine in exam. Pain also increases with LUE movements follows a shooting pain to his left hand. This most likely might be a part of initial presentation or due to dislocation / hematoma/ nerve impingement.   - CT of neck w/o contrast.   - Ibuprofen 400 Q6H PRN  - Oxycodone 10 mg po once  - May consider neurosurgery consult   - Cont wearing the recommended collar    # FEN:   - Regular adult diet   - Bolus NS once for dehydration    # Dispo: Medicine floor for further observation possibly for three days    I have reviewed the nursing notes.    I have reviewed the findings, diagnosis, plan and need for follow up with the patient.    New Prescriptions    No medications on file       Final diagnoses:   Alcohol withdrawal, uncomplicated (H)   Closed fracture of sixth cervical vertebra with routine healing, unspecified fracture morphology,  subsequent encounter       3/8/2017   [facility], [place], EMERGENCY DEPARTMENT    Pt seen and discussed with my attending, Dr. Ray.   Farrukh Babin MD  PGY1 Psychiatry Resident  Pager: [phone]    Attending addendum:    This data collected with the Resident working in the Emergency Department.  Patient was seen and evaluated by myself and I repeated the history and physical exam with the patient.  The plan of care was discussed with them.  The key portions of the note including the entire assessment and plan reflect my documentation.    My exam:    GEN:  Well developed, smells of alcohol and appears uncomfortable, is tremulous  HEENT:  PERRL, EOMI, Mucous membranes are moist. There is diffuse C-spine tenderness and left sided paraspinal tenderness at the cervical level.   Cardio:  RRR, no murmur, radial pulses equal bilaterally  PULM:  Lungs clear, good air movement, no wheezes, rales  Abd:  Soft, normal bowel sounds, no focal tenderness  Musculoskeletal:  normal range of motion of all 4 extremities, no lower extremity swelling or calf tenderness  Neuro:  Alert and oriented X3, Follows commands, CN exam is normal, finger to nose is normal, sensation and strength is normal throughout all 4 extremities although strength with flexion and extension at the elbow is 4/5 on the left and he reports increased pain in the left posterior neck area with testing of strength on the left side, so I believe that this is not a true deficit, but is pain related.  patellar reflexes are normal bilaterally, no pronator drift   Skin:  Warm, dry    Repeat CT of the C-spine today is unchanged and shows C6 fracture. Symptoms are unchanged as well. Labs are normal except as shown above. He was treated with oral ativan for withdrawal symptoms in the ED and oral oxycodone for the neck pain.  He will be admitted to medicine for withdrawal treatment and pain control.              Dionne Ray MD  03/08/17 2004

## 2017-03-09 ENCOUNTER — APPOINTMENT (OUTPATIENT)
Dept: GENERAL RADIOLOGY | Facility: CLINIC | Age: 27
DRG: 897 | End: 2017-03-09
Attending: INTERNAL MEDICINE
Payer: COMMERCIAL

## 2017-03-09 LAB
ALBUMIN SERPL-MCNC: 3.3 G/DL (ref 3.4–5)
ALP SERPL-CCNC: 52 U/L (ref 40–150)
ALT SERPL W P-5'-P-CCNC: 39 U/L (ref 0–70)
ANION GAP SERPL CALCULATED.3IONS-SCNC: 10 MMOL/L (ref 3–14)
AST SERPL W P-5'-P-CCNC: 37 U/L (ref 0–45)
BASOPHILS # BLD AUTO: 0 10E9/L (ref 0–0.2)
BASOPHILS NFR BLD AUTO: 0.2 %
BILIRUB SERPL-MCNC: 1 MG/DL (ref 0.2–1.3)
BUN SERPL-MCNC: 11 MG/DL (ref 7–30)
CALCIUM SERPL-MCNC: 8.4 MG/DL (ref 8.5–10.1)
CHLORIDE SERPL-SCNC: 99 MMOL/L (ref 94–109)
CO2 SERPL-SCNC: 24 MMOL/L (ref 20–32)
CREAT SERPL-MCNC: 0.64 MG/DL (ref 0.66–1.25)
DIFFERENTIAL METHOD BLD: ABNORMAL
EOSINOPHIL # BLD AUTO: 0.1 10E9/L (ref 0–0.7)
EOSINOPHIL NFR BLD AUTO: 0.9 %
ERYTHROCYTE [DISTWIDTH] IN BLOOD BY AUTOMATED COUNT: 13.1 % (ref 10–15)
FLUAV+FLUBV RNA SPEC QL NAA+PROBE: NORMAL
FLUAV+FLUBV RNA SPEC QL NAA+PROBE: NORMAL
GFR SERPL CREATININE-BSD FRML MDRD: ABNORMAL ML/MIN/1.7M2
GLUCOSE SERPL-MCNC: 99 MG/DL (ref 70–99)
HCT VFR BLD AUTO: 46.9 % (ref 40–53)
HGB BLD-MCNC: 16.1 G/DL (ref 13.3–17.7)
IMM GRANULOCYTES # BLD: 0 10E9/L (ref 0–0.4)
IMM GRANULOCYTES NFR BLD: 0.3 %
INR PPP: 1.01 (ref 0.86–1.14)
LYMPHOCYTES # BLD AUTO: 1.1 10E9/L (ref 0.8–5.3)
LYMPHOCYTES NFR BLD AUTO: 10.9 %
MAGNESIUM SERPL-MCNC: 1.6 MG/DL (ref 1.6–2.3)
MCH RBC QN AUTO: 30.5 PG (ref 26.5–33)
MCHC RBC AUTO-ENTMCNC: 34.3 G/DL (ref 31.5–36.5)
MCV RBC AUTO: 89 FL (ref 78–100)
MONOCYTES # BLD AUTO: 1.7 10E9/L (ref 0–1.3)
MONOCYTES NFR BLD AUTO: 16.5 %
NEUTROPHILS # BLD AUTO: 7.4 10E9/L (ref 1.6–8.3)
NEUTROPHILS NFR BLD AUTO: 71.2 %
NRBC # BLD AUTO: 0 10*3/UL
NRBC BLD AUTO-RTO: 0 /100
PHOSPHATE SERPL-MCNC: 2.2 MG/DL (ref 2.5–4.5)
PLATELET # BLD AUTO: 142 10E9/L (ref 150–450)
POTASSIUM SERPL-SCNC: 3.6 MMOL/L (ref 3.4–5.3)
PROCALCITONIN SERPL-MCNC: 0.09 NG/ML
PROT SERPL-MCNC: 7.2 G/DL (ref 6.8–8.8)
RBC # BLD AUTO: 5.28 10E12/L (ref 4.4–5.9)
RSV RNA SPEC NAA+PROBE: NORMAL
SODIUM SERPL-SCNC: 133 MMOL/L (ref 133–144)
SPECIMEN SOURCE: NORMAL
WBC # BLD AUTO: 10.3 10E9/L (ref 4–11)

## 2017-03-09 PROCEDURE — 84100 ASSAY OF PHOSPHORUS: CPT | Performed by: INTERNAL MEDICINE

## 2017-03-09 PROCEDURE — 87631 RESP VIRUS 3-5 TARGETS: CPT | Performed by: INTERNAL MEDICINE

## 2017-03-09 PROCEDURE — 84145 PROCALCITONIN (PCT): CPT | Performed by: INTERNAL MEDICINE

## 2017-03-09 PROCEDURE — 25000125 ZZHC RX 250: Performed by: INTERNAL MEDICINE

## 2017-03-09 PROCEDURE — 36415 COLL VENOUS BLD VENIPUNCTURE: CPT | Performed by: INTERNAL MEDICINE

## 2017-03-09 PROCEDURE — 99223 1ST HOSP IP/OBS HIGH 75: CPT | Mod: AI | Performed by: INTERNAL MEDICINE

## 2017-03-09 PROCEDURE — 25000132 ZZH RX MED GY IP 250 OP 250 PS 637: Performed by: INTERNAL MEDICINE

## 2017-03-09 PROCEDURE — 85610 PROTHROMBIN TIME: CPT | Performed by: INTERNAL MEDICINE

## 2017-03-09 PROCEDURE — 83735 ASSAY OF MAGNESIUM: CPT | Performed by: INTERNAL MEDICINE

## 2017-03-09 PROCEDURE — 25800025 ZZH RX 258: Performed by: INTERNAL MEDICINE

## 2017-03-09 PROCEDURE — 71020 XR CHEST 2 VW: CPT

## 2017-03-09 PROCEDURE — 80053 COMPREHEN METABOLIC PANEL: CPT | Performed by: INTERNAL MEDICINE

## 2017-03-09 PROCEDURE — 85025 COMPLETE CBC W/AUTO DIFF WBC: CPT | Performed by: INTERNAL MEDICINE

## 2017-03-09 PROCEDURE — 12000001 ZZH R&B MED SURG/OB UMMC

## 2017-03-09 RX ORDER — SODIUM CHLORIDE 9 MG/ML
INJECTION, SOLUTION INTRAVENOUS
Status: DISPENSED
Start: 2017-03-09 | End: 2017-03-10

## 2017-03-09 RX ORDER — ATENOLOL 25 MG/1
25 TABLET ORAL DAILY
Status: DISCONTINUED | OUTPATIENT
Start: 2017-03-09 | End: 2017-03-10 | Stop reason: HOSPADM

## 2017-03-09 RX ORDER — HYDRALAZINE HYDROCHLORIDE 20 MG/ML
10 INJECTION INTRAMUSCULAR; INTRAVENOUS EVERY 4 HOURS PRN
Status: DISCONTINUED | OUTPATIENT
Start: 2017-03-09 | End: 2017-03-10 | Stop reason: HOSPADM

## 2017-03-09 RX ADMIN — ACETAMINOPHEN 650 MG: 325 TABLET, FILM COATED ORAL at 16:19

## 2017-03-09 RX ADMIN — POTASSIUM CHLORIDE, DEXTROSE MONOHYDRATE AND SODIUM CHLORIDE: 150; 5; 900 INJECTION, SOLUTION INTRAVENOUS at 02:33

## 2017-03-09 RX ADMIN — FOLIC ACID 1 MG: 1 TABLET ORAL at 07:50

## 2017-03-09 RX ADMIN — ACETAMINOPHEN 650 MG: 325 TABLET, FILM COATED ORAL at 22:48

## 2017-03-09 RX ADMIN — DIAZEPAM 10 MG: 5 TABLET ORAL at 08:24

## 2017-03-09 RX ADMIN — NICOTINE 1 PATCH: 14 PATCH, EXTENDED RELEASE TRANSDERMAL at 19:36

## 2017-03-09 RX ADMIN — ACETAMINOPHEN 650 MG: 325 TABLET, FILM COATED ORAL at 06:15

## 2017-03-09 RX ADMIN — ATENOLOL 50 MG: 50 TABLET ORAL at 10:50

## 2017-03-09 RX ADMIN — CYCLOBENZAPRINE HYDROCHLORIDE 10 MG: 10 TABLET, FILM COATED ORAL at 22:47

## 2017-03-09 RX ADMIN — MULTIPLE VITAMINS W/ MINERALS TAB 1 TABLET: TAB at 07:50

## 2017-03-09 RX ADMIN — DIAZEPAM 5 MG: 5 TABLET ORAL at 19:29

## 2017-03-09 RX ADMIN — OXYCODONE HYDROCHLORIDE 5 MG: 5 TABLET ORAL at 06:15

## 2017-03-09 RX ADMIN — OXYCODONE HYDROCHLORIDE 10 MG: 5 TABLET ORAL at 10:19

## 2017-03-09 RX ADMIN — Medication 2 G: at 13:53

## 2017-03-09 RX ADMIN — NICOTINE 1 PATCH: 14 PATCH, EXTENDED RELEASE TRANSDERMAL at 07:54

## 2017-03-09 RX ADMIN — POTASSIUM PHOSPHATE, MONOBASIC AND POTASSIUM PHOSPHATE, DIBASIC 15 MMOL: 224; 236 INJECTION, SOLUTION INTRAVENOUS at 16:19

## 2017-03-09 RX ADMIN — POTASSIUM CHLORIDE 20 MEQ: 750 TABLET, FILM COATED, EXTENDED RELEASE ORAL at 07:50

## 2017-03-09 RX ADMIN — DIAZEPAM 10 MG: 5 TABLET ORAL at 09:25

## 2017-03-09 RX ADMIN — POTASSIUM CHLORIDE, DEXTROSE MONOHYDRATE AND SODIUM CHLORIDE: 150; 5; 900 INJECTION, SOLUTION INTRAVENOUS at 19:36

## 2017-03-09 RX ADMIN — CYCLOBENZAPRINE HYDROCHLORIDE 10 MG: 10 TABLET, FILM COATED ORAL at 13:52

## 2017-03-09 RX ADMIN — DIAZEPAM 10 MG: 5 TABLET ORAL at 10:50

## 2017-03-09 RX ADMIN — ATENOLOL 25 MG: 25 TABLET ORAL at 09:25

## 2017-03-09 RX ADMIN — OXYCODONE HYDROCHLORIDE 10 MG: 5 TABLET ORAL at 22:48

## 2017-03-09 RX ADMIN — DIAZEPAM 10 MG: 5 TABLET ORAL at 06:15

## 2017-03-09 RX ADMIN — CYCLOBENZAPRINE HYDROCHLORIDE 10 MG: 10 TABLET, FILM COATED ORAL at 07:50

## 2017-03-09 RX ADMIN — OXYCODONE HYDROCHLORIDE 10 MG: 5 TABLET ORAL at 02:28

## 2017-03-09 RX ADMIN — DIAZEPAM 10 MG: 5 TABLET ORAL at 02:28

## 2017-03-09 RX ADMIN — ACETAMINOPHEN 650 MG: 325 TABLET, FILM COATED ORAL at 09:25

## 2017-03-09 RX ADMIN — Medication 100 MG: at 07:50

## 2017-03-09 RX ADMIN — OXYCODONE HYDROCHLORIDE 10 MG: 5 TABLET ORAL at 16:19

## 2017-03-09 RX ADMIN — NICOTINE 1 PATCH: 14 PATCH, EXTENDED RELEASE TRANSDERMAL at 14:22

## 2017-03-09 ASSESSMENT — PAIN DESCRIPTION - DESCRIPTORS
DESCRIPTORS: DULL;ACHING
DESCRIPTORS: DULL;ACHING

## 2017-03-09 NOTE — PLAN OF CARE
Problem: Goal Outcome Summary  Goal: Goal Outcome Summary  ED admit who arrived to unit at 1900. Alert and oriented X 4, able to make needs none. Up ad candace. Continues on telemonitoring, NSR. Vitals stables except slightly elevated B/P, improved since admission. MSSA scores ranging from 8-14 and was medicated with PRN Valium 10 mg X 3 with some improvements. Complaining of upper back/neck pain and was medicated with PRN Oxycodone and Tylenol with torrey relief. Numbness and tingling to the left UE and LE. Continue the plan of care.

## 2017-03-09 NOTE — H&P
"  History and Physical      Tao Castillo MRN# [id number]   YOB: 1990 Age: 26 year old      Date of Admission:  3/8/2017    Primary care provider: Danny Camejo          Assessment and Plan:     Fever: SIRS; R/o infection  - will get influenza test, CXR, procalcitonin   - bolus fluid     ETOH withdrawal  - monitor on telemetry, seizure and fall precautions  - [facility] alcohol withdrawal protocol  - Chemical dependency consult   - Banana bag with MVT, Thiamine, Folate.   - CD     C6 fracture:  1 week ago. Persistent LUE numbness and weakness   - NSG consult  - need clarification from MRI if clip compatible      HTN urgeny: not on meds for a while apparently was on (atenolol 100mg, Losartan 100mg in 2015)   - considering previous history of aneursym and clipping (Right orbital frontal craniotomy for clipping of unruptured anterior communicating artery aneurysm (complex) with intraoperative angiography. IMPLANTED HARDWARE: #5 Sugita clip.- 12/11)   - need to be aggressive with BP management     Diony Church MD (Pager- [phone])   Internal Medicine/ Hospitalist                Chief Complaint:   Severe withdrawal.     History obtained from                     \"History is obtained from the patient\"         History of Present Illness:   26 year old year old male  With h/o HTN, Cerebral aneurysm (s/p clipping), recent Cervical fracture  admitted on 3/8/2017 for Detox from alcohol. Says drinks 0.75 L of vodka / day, Has   been to previous detox, has previous h/o withdrawal seizures. Currently denies any suicidal ideation. Denies any auditory, tactile or visual hallucinations.    Last drink Tuesday night . Feels severe tremors/ anxiety.   Denies any chest pain, shortness of breath or palpitations. Denies any nausea, vomiting or bowel symptoms.   Denies any dysuria or frequency of urination.   C/O Flu like symptoms for the last 2 days (running nose, watery eyes), has cough with greenish phlegm . No recent " sick contact . Has fever                  Past Medical History:     Past Medical History   Diagnosis Date     Alcohol dependence (H)      Aneurysm (H) 5/11     [facility]. sees neuro Dr. Latham     BMI 34.0-34.9,adult      CARDIOVASCULAR SCREENING; LDL GOAL LESS THAN 160      HTN (hypertension)      Marijuana dependence (H)      Polysubstance abuse              Past Surgical History:     Past Surgical History   Procedure Laterality Date     Craniotomy  5/11     rt frontal orbital-clipping of acom aneurysm             Social History:     Social History     Social History     Marital status: Single     Spouse name: N/A     Number of children: N/A     Years of education: N/A     Social History Main Topics     Smoking status: Current Every Day Smoker     Packs/day: 0.50     Years: 5.00     Types: Cigarettes     Last attempt to quit: 5/15/2009     Smokeless tobacco: Never Used     Alcohol use Yes      Comment: daily .75 liter     Drug use: No      Comment: marijuana     Sexual activity: Yes     Partners: Female     Birth control/ protection: Condom     Other Topics Concern     Parent/Sibling W/ Cabg, Mi Or Angioplasty Before 65f 55m? No     Social History Narrative             Family History:     Family History   Problem Relation Age of Onset     Asthma Father              Immunizations:     Immunization History   Administered Date(s) Administered     TD (ADULT, 7+) 01/01/2008            Allergies:     Allergies   Allergen Reactions     Nkda [No Known Drug Allergies]              Medications:     No prescriptions prior to admission.             Review of Systems:   The 10 point Review of Systems is negative other than noted in the HPI      Vitals were reviewed  Vitals: BP (!) 171/96 (BP Location: Left arm)  Pulse 88  Temp 100.6  F (38.1  C) (Oral)  Resp 18  Wt 124.9 kg (275 lb 6 oz)  SpO2 96%  BMI 32.65 kg/m2  BMI= Body mass index is 32.65 kg/(m^2).    GEN:               Sweaty   HEENT: Anicteric, extraocular muscles are  intact with no nystagmus    Moist mucous membranes with no oropharyngeal erythema or discharge  NECK:  Supple with no jugular venous distention  CVS:  s1s2 show a regular rate and rhythm with no murmurs, rubs or gallops,      carotid pulses normal, distal pulses normal  CHEST: Bilaterally clear to auscultation with no rales, rhonchi or wheezes  ABDOMEN: Non-tender and non-distended     normal bowel sounds in all four quadrants    No rebound or gaurding    Obese abdomen  EXT:  No cyanosis, clubbing or edema  NEURO: Alert and oriented to person, place and time    Decreased power LUE     Sensory is grossly intact to touch except LUE (decreased compared to Rt)     Cranial nerves II-XII are intact  PSCYH: Normal affect  SKIN:  No rashes or jaundice           Data:     Latest Labs:     BMP  Recent Labs  Lab 03/09/17  0552 03/08/17  1525    136   POTASSIUM 3.6 3.8   CHLORIDE 99 103   YOSEPH 8.4* 8.8   CO2 24 24   BUN 11 12   CR 0.64* 0.59*   GLC 99 77     CBC  Recent Labs  Lab 03/09/17  0552   WBC 10.3   RBC 5.28   HGB 16.1   HCT 46.9   MCV 89   MCH 30.5   MCHC 34.3   RDW 13.1   *     INR  Recent Labs  Lab 03/09/17  0552   INR 1.01     LFTs  Recent Labs  Lab 03/09/17  0552 03/08/17  1525   ALKPHOS 52 60   AST 37 44   ALT 39 51   BILITOTAL 1.0 0.6   PROTTOTAL 7.2 8.3   ALBUMIN 3.3* 3.8         CT CERVICAL SPINE WITHOUT CONTRAST   3/8/2017 6:28 PM   IMPRESSION:  1. No change in nondisplaced left C6 superior articular process  fracture.  2. There is no evidence for any malalignment, stenosis, or epidural  hematoma. If clinical symptoms persist or progress, MRI can be more  sensitive in detecting subtle disc protrusions or subtle hemorrhages.

## 2017-03-09 NOTE — H&P
[facility] History and Physical    Tao Castillo MRN# [id number]   Age: 26 year old YOB: 1990     Date of Admission:  3/8/2017    Home clinic: [facility]   Primary care provider: Danny Camejo          Assessment and Plan:   Assessment:   26 yr old with PMH of HTN, Alcohol abuse, S/P clipping of cerebral aneurysmm recent stable C 6 fracture and h/o alcohol withdrawal seizures admitting for detox for severe withdrawal symptoms   Plan:  1) Alcohol abuse with withdrawal:  S/P MVI infusion followed by maintenance IV fluids   MSSA protocol with Valium  Monitor with telemetry and replace electrolytes as per protocol  Seizure precautions   Multivitamin, Thiamine and folate  SW and Chem Dep consult    2) Stable C 6 fracture:  Repeat CT spine with stable appearing fracture  Continue Oxycodone for pain and flexeril for muscle spasms  Cervical collar as instructed  Per ED provider, his aneurysm clip is MRI compatible, but this may need to be rechecked if MRI is needed in future    3) HTN:  Patient was on maximal dose of atenolol and  Losartan in the past. He stopped taking this several years ago  May need BP medications in case BP is not adequately controlled in withdrawal period         DVT Prophylaxis: Ambulate, PCD's  Gastric: None  Diet: Regular adult  Disposition: To be determined             Chief Complaint:   Alcohol withdrawal      History is obtained from the patient , ER physician and chart review     Tao Castillo is a 26 year old male with significant PMH of clipped cerebrovascular aneurism( 5 yrs ago), alcoholism, etoh W/D seizure and recent C6 spine fracture who presented to ED with symptoms of Etoh W/D and left sided numbness and tingling.      Patient has a 10 years history of Etoh use , drinking .075 vodka daily and has had numerous W/D experiences in the past, one in November that lead to seizure. His last use was yesterday at 1200 hrs when he drank so much and passed  "out.   Of note, he had a neck injury on 3/1 when he was wrestling with his friend and broke his C6. Then he presented to [facility] with great deal of pain and left sided numbness. Cervical CT showed fx and he was provided with oxycodone, medrol dose pack, flexeril  and collar with the plan to follow up today. He ran out of pain med 3 days ago and since then has been drinking to self medicate for pain.      Regarding the clipped cerebrovascular aneurism he reports he had a severe headache 6 years ago and he was diagnosed with \"leaky aneurism\" and was operated immediately.     Patient has been in alcohol treatment program about 3 times, the last one being in Nov 2015 at [facility] in [place], [place]. He relapsed almost immediately after coming out of the program. His triggering factors are depression, although he has no intention of harming himself    With regards to his numbness in the left upper extremity, it has been constant since the fall. No increase. No weakness. No bladder or bowel disturbance.  He feels that he is going though a bad withdrawal now, not limited to tremors, shaking and severe sweating.    He has been diagnosed with HTN in the last and was on atenolol and losartan at max doses, that he has not been taking for years             Past Medical History:   Reviewed and updated in EPIC  Past Medical History   Diagnosis Date     Alcohol dependence (H)      Aneurysm (H) 5/11     acom. sees neuro Dr. Latham     BMI 34.0-34.9,adult      CARDIOVASCULAR SCREENING; LDL GOAL LESS THAN 160      HTN (hypertension)      Marijuana dependence (H)      Polysubstance abuse      Stable C 6 fracture          Past Surgical History:      Past Surgical History   Procedure Laterality Date     Craniotomy  5/11     rt frontal orbital-clipping of acom aneurysm             Social History:     Social History   Substance Use Topics     Smoking status: Current Every Day Smoker     Packs/day: 0.50     Years: 5.00     Types: " Cigarettes     Last attempt to quit: 5/15/2009     Smokeless tobacco: Never Used     Alcohol use Yes      Comment: daily .75 liter             Family History:     Family History   Problem Relation Age of Onset     Asthma Father              Immunizations:     Immunization History   Administered Date(s) Administered     TD (ADULT, 7+) 01/01/2008             Allergies:     Allergies   Allergen Reactions     Nkda [No Known Drug Allergies]              Medications:     No current facility-administered medications on file prior to encounter.   No current outpatient prescriptions on file prior to encounter.           Review of Systems:   A comprehensive review of systems was performed and found to be negative except as described in this note           Physical Exam:   Vitals were reviewed  Temp: 98.6  F (37  C) Temp src: Oral BP: (!) 186/113 Pulse: 100 Heart Rate: 101 Resp: 16 SpO2: 99 % O2 Device: None (Room air)    Constitutional:   awake, alert, cooperative, no apparent distress, and appears stated age     Eyes:   Lids and lashes normal, pupils equal, round and reactive to light, extra ocular muscles intact, sclera clear, conjunctiva normal     ENT:   Normocephalic, without obvious abnormality, atraumatic, sinuses nontender on palpation, external ears without lesions, oral pharynx with moist mucous membranes, tonsils without erythema or exudates, gums normal and good dentition.     Neck:   Supple, symmetrical, trachea midline, no adenopathy, thyroid symmetric, not enlarged and no tenderness, skin normal     Lungs:   No increased work of breathing, good air exchange, clear to auscultation bilaterally, no crackles or wheezing     Cardiovascular:   Normal apical impulse, regular rate and rhythm, normal S1 and S2, no S3 or S4, and no murmur noted     Abdomen:   No scars, normal bowel sounds, soft, non-distended, non-tender, no masses palpated, no hepatosplenomegally     Musculoskeletal:   no lower extremity pitting edema  present  there is no redness, warmth, or swelling of the joints     Neurologic:   Awake, alert, oriented to name, place and time.  Cranial nerves II-XII are grossly intact.  Motor is 5 out of 5 bilaterally in both upper and lower limbs with preserved and symmetrical reflexes..     Skin:   no bruising or bleeding and normal skin color, texture, turgor             Data:   BMP    Recent Labs  Lab 03/08/17  1525      POTASSIUM 3.8   CHLORIDE 103   YOSEPH 8.8   CO2 24   BUN 12   CR 0.59*   GLC 77     CBCNo lab results found in last 7 days.  INRNo lab results found in last 7 days.  LFTs    Recent Labs  Lab 03/08/17  1525   ALKPHOS 60   AST 44   ALT 51   BILITOTAL 0.6   PROTTOTAL 8.3   ALBUMIN 3.8      PANCNo lab results found in last 7 days.         Vamsi Alfonso MD, [facility]  [facility] Physican (Staff Hospitalist)  [facility]  Pager: [phone]

## 2017-03-09 NOTE — PLAN OF CARE
Problem: Goal Outcome Summary  Goal: Goal Outcome Summary  Outcome: Improving  A&O. Hypertensive and febrile. Trending down. Tight control on BP d/t brain aneurism. Tylenol given for fever. MSSA protocol- diazepam given, as needed. Pt is extremely diaphoretic. Took tele leads off. Potassium/ magnesium replaced. Lanie nurse to replace phosphorous. C/O right extremity numbness, since cervical fracture. MD aware. Neuro and orthosis consults placed. X-Ray completed. Negative fore flu/ rapid RSV. Continue POC.

## 2017-03-09 NOTE — PHARMACY-ADMISSION MEDICATION HISTORY
Admission Medication History status for the 3/8/2017 admission is complete.  See EPIC admission navigator for Prior to Admission medications.    Medication history sources:  Patient     Medication history source reliability: Good    Medication adherence:  N/A. Patient does not report taking any medications    Changes made to PTA medication list (reason)  Added: None  Deleted: None  Changed: None    Additional medication history information (including reliability of information, actions taken by pharmacist):   Patient reports not taking any prescription medication, any OTC medications, any PRN medications, or any herbal supplements    Time spent in this activity: 5min    Medication history completed by: Sebastian Zamora, Pharmacy Intern    Prior to Admission medications    Not on File

## 2017-03-09 NOTE — PROGRESS NOTES
Social Work: Assessment with Discharge Plan    Patient Name:  Tao Castillo  :  1990  Age:  26 year old  MRN:  [id number]  Risk/Complexity Score:  Filed Complexity Screen Score: 7  Completed assessment with:  Pt, medical chart, 10A staff    Presenting Information   Reason for Referral:  Substance abuse concerns  Date of Intake:  2017  Referral Source:  Physician  Decision Maker:  pt  Alternate Decision Maker:  family    Living Situation:  House  Previous Functional Status:  Independent  Patient and family understanding of hospitalization:  Pt experiencing Alcohol Withdrawal  Cultural/Language/Spiritual Considerations:  . Pt states he has MI/CD resources and is on wait list to start outpt program following this relapse  Adjustment to Illness:  accpeting    Physical Health  Reason for Admission:    1. Alcohol withdrawal, uncomplicated (H)    2. Closed fracture of sixth cervical vertebra with routine healing, unspecified fracture morphology, subsequent encounter      Services Needed/Recommended:  Other:  home w/outpt CD ([facility])    Mental Health/Chemical Dependency  Diagnosis:  Alcohol Substance Abuse,   Support/Services in Place:  Pt states he has treatment through [facility]  Services Needed/Recommended:  con't CD Treatment    Support System  Significant relationship at present time:  None identified  Family of origin is available for support:  Not identified  Other support available:  Pt identifies having support of sober friends/sponsors, and counselor  Gaps in support system:  Additional sober support  Patient is caregiver to:  None     Provider Information   Primary Care Physician:  Danny Camejo   [phone]   [address]  [address] [facility] *      :      Financial   Income Source:  Not identified  Financial Concerns:  None noted  Insurance:    Payor/Plan Subscriber Name Rel Member # Group #   HEALTH  "PARTNERS -  * DAWIT BLOOD AL*  [id number]      [address]       Discharge Plan   Patient and family discharge goal:  DC home  Provided education on discharge plan:  YES  Patient agreeable to discharge plan:  YES  Barriers to discharge:  Medical stability    Discharge Recommendations   Anticipated Disposition:  Home with services  Transportation Needs:  Other:  to be determined  Name of Transportation Company and Phone:  To be determined    Additional comments   Writer met w/pt and introduced role/reason for visit. This afternoon, pt states he is \"Feeling much better\" and has requested to be DC'd. Pt is not medically stable for DC (per Gabrielle, pt's bedside RN and Dr. Church). Writer and pt discussed his willingness for CD treatment. Pt states he has connected with sponsor, he has awareness of resources available. He did not identify need for additional services or resources. Pt verbalized understanding that SW is available per request/referral  "

## 2017-03-09 NOTE — PROGRESS NOTES
Patient was sleeping upon arrival. I woke patient to fit with [place] J collar. Patient stated that he has a collar. Patient has a aspen vista collar in [place]win bag. I did show the patient the [place] J collar. Patient  declined the collar due to the fact he had a aspen vista collar. I did not provide a collar at this time.

## 2017-03-10 ENCOUNTER — HOSPITAL ENCOUNTER (INPATIENT)
Facility: CLINIC | Age: 27
LOS: 2 days | Discharge: HOME | DRG: 897 | End: 2017-03-12
Attending: PSYCHIATRY & NEUROLOGY | Admitting: PSYCHIATRY & NEUROLOGY
Payer: COMMERCIAL

## 2017-03-10 ENCOUNTER — APPOINTMENT (OUTPATIENT)
Dept: GENERAL RADIOLOGY | Facility: CLINIC | Age: 27
DRG: 897 | End: 2017-03-10
Payer: COMMERCIAL

## 2017-03-10 VITALS
HEART RATE: 88 BPM | SYSTOLIC BLOOD PRESSURE: 160 MMHG | WEIGHT: 275.38 LBS | BODY MASS INDEX: 32.65 KG/M2 | DIASTOLIC BLOOD PRESSURE: 93 MMHG | OXYGEN SATURATION: 97 % | RESPIRATION RATE: 20 BRPM | TEMPERATURE: 97.3 F

## 2017-03-10 PROBLEM — R65.10 SIRS (SYSTEMIC INFLAMMATORY RESPONSE SYNDROME) (H): Status: ACTIVE | Noted: 2017-03-10

## 2017-03-10 PROBLEM — S12.9XXA CERVICAL SPINE FRACTURE (H): Status: ACTIVE | Noted: 2017-03-10

## 2017-03-10 PROBLEM — I16.0 HYPERTENSIVE URGENCY: Status: ACTIVE | Noted: 2017-03-10

## 2017-03-10 PROBLEM — F19.20 CHEMICAL DEPENDENCY (H): Status: ACTIVE | Noted: 2017-03-10

## 2017-03-10 LAB
MAGNESIUM SERPL-MCNC: 2.1 MG/DL (ref 1.6–2.3)
PHOSPHATE SERPL-MCNC: 3.6 MG/DL (ref 2.5–4.5)
POTASSIUM SERPL-SCNC: 4.5 MMOL/L (ref 3.4–5.3)

## 2017-03-10 PROCEDURE — 83735 ASSAY OF MAGNESIUM: CPT | Performed by: INTERNAL MEDICINE

## 2017-03-10 PROCEDURE — 72040 X-RAY EXAM NECK SPINE 2-3 VW: CPT

## 2017-03-10 PROCEDURE — 25000125 ZZHC RX 250: Performed by: INTERNAL MEDICINE

## 2017-03-10 PROCEDURE — 25800025 ZZH RX 258: Performed by: INTERNAL MEDICINE

## 2017-03-10 PROCEDURE — 25000132 ZZH RX MED GY IP 250 OP 250 PS 637: Performed by: INTERNAL MEDICINE

## 2017-03-10 PROCEDURE — 36415 COLL VENOUS BLD VENIPUNCTURE: CPT | Performed by: INTERNAL MEDICINE

## 2017-03-10 PROCEDURE — HZ2ZZZZ DETOXIFICATION SERVICES FOR SUBSTANCE ABUSE TREATMENT: ICD-10-PCS | Performed by: PSYCHIATRY & NEUROLOGY

## 2017-03-10 PROCEDURE — 84132 ASSAY OF SERUM POTASSIUM: CPT | Performed by: INTERNAL MEDICINE

## 2017-03-10 PROCEDURE — 84100 ASSAY OF PHOSPHORUS: CPT | Performed by: INTERNAL MEDICINE

## 2017-03-10 PROCEDURE — 12800008 ZZH R&B CD ADULT

## 2017-03-10 PROCEDURE — 99239 HOSP IP/OBS DSCHRG MGMT >30: CPT | Performed by: INTERNAL MEDICINE

## 2017-03-10 PROCEDURE — 25000132 ZZH RX MED GY IP 250 OP 250 PS 637: Performed by: PSYCHIATRY & NEUROLOGY

## 2017-03-10 RX ORDER — TRAZODONE HYDROCHLORIDE 50 MG/1
50 TABLET, FILM COATED ORAL
Status: DISCONTINUED | OUTPATIENT
Start: 2017-03-10 | End: 2017-03-12 | Stop reason: HOSPADM

## 2017-03-10 RX ORDER — ATENOLOL 50 MG/1
50 TABLET ORAL DAILY PRN
Status: DISCONTINUED | OUTPATIENT
Start: 2017-03-10 | End: 2017-03-11

## 2017-03-10 RX ORDER — LANOLIN ALCOHOL/MO/W.PET/CERES
100 CREAM (GRAM) TOPICAL DAILY
Status: CANCELLED | OUTPATIENT
Start: 2017-03-10

## 2017-03-10 RX ORDER — ONDANSETRON 4 MG/1
4 TABLET, ORALLY DISINTEGRATING ORAL EVERY 6 HOURS PRN
Status: CANCELLED | OUTPATIENT
Start: 2017-03-10

## 2017-03-10 RX ORDER — ATENOLOL 25 MG/1
25 TABLET ORAL DAILY
Status: CANCELLED | OUTPATIENT
Start: 2017-03-10

## 2017-03-10 RX ORDER — LANOLIN ALCOHOL/MO/W.PET/CERES
100 CREAM (GRAM) TOPICAL DAILY
Status: ON HOLD | DISCHARGE
Start: 2017-03-10 | End: 2017-03-12

## 2017-03-10 RX ORDER — DIAZEPAM 5 MG
5-20 TABLET ORAL EVERY 30 MIN PRN
Status: DISCONTINUED | OUTPATIENT
Start: 2017-03-10 | End: 2017-03-12 | Stop reason: HOSPADM

## 2017-03-10 RX ORDER — MULTIPLE VITAMINS W/ MINERALS TAB 9MG-400MCG
1 TAB ORAL DAILY
Status: DISCONTINUED | OUTPATIENT
Start: 2017-03-11 | End: 2017-03-12 | Stop reason: HOSPADM

## 2017-03-10 RX ORDER — MULTIPLE VITAMINS W/ MINERALS TAB 9MG-400MCG
1 TAB ORAL DAILY
Qty: 30 EACH | Refills: 0 | Status: ON HOLD | DISCHARGE
Start: 2017-03-10 | End: 2017-03-12

## 2017-03-10 RX ORDER — BISACODYL 10 MG
10 SUPPOSITORY, RECTAL RECTAL DAILY PRN
Status: DISCONTINUED | OUTPATIENT
Start: 2017-03-10 | End: 2017-03-12 | Stop reason: HOSPADM

## 2017-03-10 RX ORDER — LABETALOL HYDROCHLORIDE 5 MG/ML
10-40 INJECTION, SOLUTION INTRAVENOUS EVERY 10 MIN PRN
Status: CANCELLED | OUTPATIENT
Start: 2017-03-10

## 2017-03-10 RX ORDER — LIDOCAINE 40 MG/G
CREAM TOPICAL
Status: CANCELLED | OUTPATIENT
Start: 2017-03-10

## 2017-03-10 RX ORDER — LANOLIN ALCOHOL/MO/W.PET/CERES
100 CREAM (GRAM) TOPICAL DAILY
Status: DISCONTINUED | OUTPATIENT
Start: 2017-03-11 | End: 2017-03-12 | Stop reason: HOSPADM

## 2017-03-10 RX ORDER — IBUPROFEN 600 MG/1
600 TABLET, FILM COATED ORAL EVERY 6 HOURS PRN
Status: DISCONTINUED | OUTPATIENT
Start: 2017-03-10 | End: 2017-03-12 | Stop reason: HOSPADM

## 2017-03-10 RX ORDER — HYDRALAZINE HYDROCHLORIDE 20 MG/ML
10-20 INJECTION INTRAMUSCULAR; INTRAVENOUS EVERY 30 MIN PRN
Status: CANCELLED | OUTPATIENT
Start: 2017-03-10

## 2017-03-10 RX ORDER — ACETAMINOPHEN 325 MG/1
650 TABLET ORAL EVERY 4 HOURS PRN
Status: CANCELLED | OUTPATIENT
Start: 2017-03-10

## 2017-03-10 RX ORDER — FOLIC ACID 1 MG/1
1 TABLET ORAL DAILY
Status: CANCELLED | OUTPATIENT
Start: 2017-03-10

## 2017-03-10 RX ORDER — NALOXONE HYDROCHLORIDE 0.4 MG/ML
.1-.4 INJECTION, SOLUTION INTRAMUSCULAR; INTRAVENOUS; SUBCUTANEOUS
Status: CANCELLED | OUTPATIENT
Start: 2017-03-10

## 2017-03-10 RX ORDER — ONDANSETRON 2 MG/ML
4 INJECTION INTRAMUSCULAR; INTRAVENOUS EVERY 6 HOURS PRN
Status: CANCELLED | OUTPATIENT
Start: 2017-03-10

## 2017-03-10 RX ORDER — SODIUM CHLORIDE 9 MG/ML
INJECTION, SOLUTION INTRAVENOUS CONTINUOUS
Status: CANCELLED | OUTPATIENT
Start: 2017-03-10

## 2017-03-10 RX ORDER — ACETAMINOPHEN 325 MG/1
650 TABLET ORAL EVERY 4 HOURS PRN
Status: CANCELLED | OUTPATIENT
Start: 2017-03-13

## 2017-03-10 RX ORDER — MULTIPLE VITAMINS W/ MINERALS TAB 9MG-400MCG
1 TAB ORAL DAILY
Status: CANCELLED | OUTPATIENT
Start: 2017-03-10

## 2017-03-10 RX ORDER — CALCIUM CARBONATE 500 MG/1
1-2 TABLET, CHEWABLE ORAL 4 TIMES DAILY PRN
Status: CANCELLED | OUTPATIENT
Start: 2017-03-10

## 2017-03-10 RX ORDER — ALUMINA, MAGNESIA, AND SIMETHICONE 2400; 2400; 240 MG/30ML; MG/30ML; MG/30ML
30 SUSPENSION ORAL EVERY 4 HOURS PRN
Status: DISCONTINUED | OUTPATIENT
Start: 2017-03-10 | End: 2017-03-12 | Stop reason: HOSPADM

## 2017-03-10 RX ORDER — NALOXONE HYDROCHLORIDE 0.4 MG/ML
.1-.4 INJECTION, SOLUTION INTRAMUSCULAR; INTRAVENOUS; SUBCUTANEOUS
Status: CANCELLED | OUTPATIENT
Start: 2017-03-10 | End: 2017-03-12

## 2017-03-10 RX ORDER — HYDROMORPHONE HYDROCHLORIDE 1 MG/ML
.3-.5 INJECTION, SOLUTION INTRAMUSCULAR; INTRAVENOUS; SUBCUTANEOUS
Status: CANCELLED | OUTPATIENT
Start: 2017-03-10

## 2017-03-10 RX ORDER — CYCLOBENZAPRINE HCL 10 MG
10 TABLET ORAL 3 TIMES DAILY PRN
Status: DISCONTINUED | OUTPATIENT
Start: 2017-03-10 | End: 2017-03-12 | Stop reason: HOSPADM

## 2017-03-10 RX ORDER — ATENOLOL 25 MG/1
25 TABLET ORAL DAILY
Qty: 30 TABLET | Status: ON HOLD | DISCHARGE
Start: 2017-03-10 | End: 2017-03-12

## 2017-03-10 RX ORDER — HYDROXYZINE HYDROCHLORIDE 25 MG/1
25-50 TABLET, FILM COATED ORAL EVERY 4 HOURS PRN
Status: DISCONTINUED | OUTPATIENT
Start: 2017-03-10 | End: 2017-03-12 | Stop reason: HOSPADM

## 2017-03-10 RX ORDER — OXYCODONE HYDROCHLORIDE 5 MG/1
5-10 TABLET ORAL
Status: CANCELLED | OUTPATIENT
Start: 2017-03-10

## 2017-03-10 RX ORDER — CYCLOBENZAPRINE HCL 10 MG
10 TABLET ORAL 3 TIMES DAILY
Qty: 42 TABLET | Status: ON HOLD | DISCHARGE
Start: 2017-03-10 | End: 2017-03-12

## 2017-03-10 RX ORDER — FOLIC ACID 1 MG/1
1 TABLET ORAL DAILY
Status: DISCONTINUED | OUTPATIENT
Start: 2017-03-11 | End: 2017-03-12 | Stop reason: HOSPADM

## 2017-03-10 RX ORDER — ACETAMINOPHEN 325 MG/1
975 TABLET ORAL EVERY 8 HOURS
Status: CANCELLED | OUTPATIENT
Start: 2017-03-10 | End: 2017-03-13

## 2017-03-10 RX ORDER — CEFAZOLIN SODIUM 1 G/50ML
3 SOLUTION INTRAVENOUS
Status: CANCELLED | OUTPATIENT
Start: 2017-03-10

## 2017-03-10 RX ORDER — FOLIC ACID 1 MG/1
1 TABLET ORAL DAILY
Qty: 30 TABLET | Status: ON HOLD | DISCHARGE
Start: 2017-03-10 | End: 2017-03-12

## 2017-03-10 RX ORDER — ACETAMINOPHEN 325 MG/1
650 TABLET ORAL EVERY 4 HOURS PRN
Qty: 100 TABLET | Status: ON HOLD | DISCHARGE
Start: 2017-03-10 | End: 2017-03-12

## 2017-03-10 RX ORDER — FLUMAZENIL 0.1 MG/ML
0.2 INJECTION, SOLUTION INTRAVENOUS
Status: CANCELLED | OUTPATIENT
Start: 2017-03-10 | End: 2017-03-12

## 2017-03-10 RX ORDER — ACETAMINOPHEN 325 MG/1
650 TABLET ORAL EVERY 4 HOURS PRN
Status: DISCONTINUED | OUTPATIENT
Start: 2017-03-10 | End: 2017-03-12 | Stop reason: HOSPADM

## 2017-03-10 RX ORDER — ALUMINA, MAGNESIA, AND SIMETHICONE 2400; 2400; 240 MG/30ML; MG/30ML; MG/30ML
15-30 SUSPENSION ORAL EVERY 4 HOURS PRN
Status: CANCELLED | OUTPATIENT
Start: 2017-03-10

## 2017-03-10 RX ORDER — ONDANSETRON 4 MG/1
4 TABLET, ORALLY DISINTEGRATING ORAL EVERY 6 HOURS PRN
Status: DISCONTINUED | OUTPATIENT
Start: 2017-03-10 | End: 2017-03-12 | Stop reason: HOSPADM

## 2017-03-10 RX ORDER — PROCHLORPERAZINE MALEATE 5 MG
5-10 TABLET ORAL EVERY 6 HOURS PRN
Status: CANCELLED | OUTPATIENT
Start: 2017-03-10

## 2017-03-10 RX ORDER — CYCLOBENZAPRINE HCL 10 MG
10 TABLET ORAL 3 TIMES DAILY
Status: CANCELLED | OUTPATIENT
Start: 2017-03-10

## 2017-03-10 RX ADMIN — NICOTINE 1 PATCH: 14 PATCH, EXTENDED RELEASE TRANSDERMAL at 08:55

## 2017-03-10 RX ADMIN — Medication 100 MG: at 08:54

## 2017-03-10 RX ADMIN — FOLIC ACID 1 MG: 1 TABLET ORAL at 08:54

## 2017-03-10 RX ADMIN — CYCLOBENZAPRINE HYDROCHLORIDE 10 MG: 10 TABLET, FILM COATED ORAL at 15:14

## 2017-03-10 RX ADMIN — ATENOLOL 25 MG: 25 TABLET ORAL at 08:54

## 2017-03-10 RX ADMIN — DIAZEPAM 10 MG: 5 TABLET ORAL at 08:57

## 2017-03-10 RX ADMIN — OXYCODONE HYDROCHLORIDE 10 MG: 5 TABLET ORAL at 15:14

## 2017-03-10 RX ADMIN — CYCLOBENZAPRINE HYDROCHLORIDE 10 MG: 10 TABLET, FILM COATED ORAL at 08:54

## 2017-03-10 RX ADMIN — DIAZEPAM 10 MG: 5 TABLET ORAL at 19:46

## 2017-03-10 RX ADMIN — POTASSIUM CHLORIDE, DEXTROSE MONOHYDRATE AND SODIUM CHLORIDE: 150; 5; 900 INJECTION, SOLUTION INTRAVENOUS at 08:03

## 2017-03-10 RX ADMIN — DIAZEPAM 10 MG: 5 TABLET ORAL at 01:53

## 2017-03-10 RX ADMIN — MULTIPLE VITAMINS W/ MINERALS TAB 1 TABLET: TAB at 08:54

## 2017-03-10 RX ADMIN — OXYCODONE HYDROCHLORIDE 10 MG: 5 TABLET ORAL at 01:53

## 2017-03-10 RX ADMIN — FOLIC ACID: 5 INJECTION, SOLUTION INTRAMUSCULAR; INTRAVENOUS; SUBCUTANEOUS at 11:42

## 2017-03-10 ASSESSMENT — ACTIVITIES OF DAILY LIVING (ADL)
LAUNDRY: WITH SUPERVISION
DRESS: STREET CLOTHES;INDEPENDENT
GROOMING: INDEPENDENT
ORAL_HYGIENE: INDEPENDENT

## 2017-03-10 ASSESSMENT — PAIN DESCRIPTION - DESCRIPTORS: DESCRIPTORS: ACHING;DULL

## 2017-03-10 NOTE — PROGRESS NOTES
"Patient seen and examined with RN     S- feels better but still requiring BZD  No more fever/ chills   Numbness better as per him     4 point ROS done and neg unless mentioned     O-   /89 (BP Location: Right arm)  Pulse 89  Temp 98.2  F (36.8  C)  Resp 18  Wt 124.9 kg (275 lb 6 oz)  SpO2 96%  BMI 32.65 kg/m2   Neck- supple  CVS- I+II+ no m/r/g  RS- CTAB  Abdo- soft, no tenderness . No g/r/r   Ext- no edema     CNS- no gross focal signs. Oriented to person, place and time    Mild tremor+    LABS:   BMP  Recent Labs  Lab 03/10/17  0537 03/09/17  0552 03/08/17  1525   NA  --  133 136   POTASSIUM 4.5 3.6 3.8   CHLORIDE  --  99 103   YOSEPH  --  8.4* 8.8   CO2  --  24 24   BUN  --  11 12   CR  --  0.64* 0.59*   GLC  --  99 77     CBC  Recent Labs  Lab 03/09/17  0552   WBC 10.3   RBC 5.28   HGB 16.1   HCT 46.9   MCV 89   MCH 30.5   MCHC 34.3   RDW 13.1   *     INR  Recent Labs  Lab 03/09/17  0552   INR 1.01     LFTs  Recent Labs  Lab 03/09/17  0552 03/08/17  1525   ALKPHOS 52 60   AST 37 44   ALT 39 51   BILITOTAL 1.0 0.6   PROTTOTAL 7.2 8.3   ALBUMIN 3.3* 3.8      Procalcitonin- 0.09  CXR: neg    A/P:  Fever/ SIRS; Better . ( influenza test-neg, CXR-neg, procalcitonin -neg)     ETOH withdrawal  - monitor on telemetry, seizure and fall precautions  - MSSA alcohol withdrawal protocol with valium   - Chemical dependency consult   - Banana bag with MVT, Thiamine, Folate.   - CD     Anxiety: Psych consult- feels contributing to his drinking      C6 fracture: 1 week ago. Persistent LUE numbness and weakness   - NSG consult appreciated.\"1. Upright AP and lateral x-rays of the C-spine with cervical collar in place.   2. Agree with an [place] collar. Recommend the patient wear it continuously with the exception showers during which time he became wear a Brook brace. We also recommend acquisition of extra pads for the [place] collar as the patient will require continuous collar use for 6 weeks.   3. Follow up in " "Neurosurgery Clinic in 6 weeks with one of our nurse practitioners. Require AP and lateral cervical spine x-rays prior to the visit.   - need clarification from MRI if clip compatible\"       HTN urgeny: not on meds for a while (apparently was on atenolol 100mg, Losartan 100mg in 2015)   - considering previous history of aneursym and clipping (Right orbital frontal craniotomy for clipping of unruptured anterior communicating artery aneurysm (complex) with intraoperative angiography. IMPLANTED HARDWARE: #5 Sugita clip.- 12/11) need to be aggressive with BP management .    - Ct Atenolol 25 mg QD     Diony Church MD (Pager- [phone])   Internal Medicine/ Hospitalist    "

## 2017-03-10 NOTE — IP AVS SNAPSHOT
MRN:[id number]                      After Visit Summary   3/10/2017    Tao Castillo    MRN: [id number]           Thank you!     Thank you for choosing [facility] for your care. Our goal is always to provide you with excellent care.        Patient Information     Date Of Birth          1990        About your hospital stay     You were admitted on:  March 10, 2017 You last received care in the:  [facility]    You were discharged on:  March 12, 2017       Who to Call     For medical emergencies, please call 911.  For non-urgent questions about your medical care, please call your primary care provider or clinic, [phone]          Attending Provider     Provider Specialty    Stacey Damon MD Psychiatry       Primary Care Provider Office Phone # Fax #    Danny Camejo [phone]       [facility] [address] [facility] [address]        Further instructions from your care team       Behavioral Discharge Planning and Instructions  THANK YOU FOR CHOOSING THE [address]  [phone]    Summary: You were admitted to [facility] on 3/10/2017 for alcohol dependence   You are being discharged to home. You have declined case management.  Please take care and make your recovery a priority.    Recommendation: Please contact [facility] intake at [phone] if unable to maintain sobriety and in need of increased level of care.    Main Diagnosis:  Alcohol dependence    Major Treatments, Procedures and Findings:  You received medical treatment for the symptoms of alcohol withdrawal. A medical exam was performed that included lab work. You have met with a .       Symptoms to Report:  If you experience more anxiety, confusion, sleeplessness, deep sadness or thoughts of suicide, notify your treatment team or notify your primary care physician. IF ANY OF THE SYMPTOMS YOU ARE EXPERIENCING ARE A  MEDICAL EMERGENCY CALL 911 IMMEDIATELY.     Lifestyle Adjustment: Adjust your lifestyle to get enough sleep, relaxation, exercise and  good nutrition. Continue to develop healthy coping skills to decrease stress and promote a sober living environment. Do not use alcohol, illegal drugs or addictive medications other than what is currently prescribed. AA, NA, and  Sponsor are good resources for support.     Primary Provider: Pt states he does not have a PMD. Pt encouraged to establish a clinic relationship for follow up care.      Resources:     [facility] [phone]    Support Group:  AA/NA and Sponsor/support    Crisis Intervention: 706.217.7807 or 675-863-9670 (TTY: 691.836.3005).  Call anytime for help.  National [place] on Mental Illness (www.[place].org): 979.989.9166 or 222-409-4832.  Alcoholics Anonymous (www.alcoholics-anonymous.org): Check your phone book for your local chapter.  Suicide Awareness Voices of Education (SAVE) (www.save.org): 512-861-CYXZ (6283)  National Suicide Prevention Line (www.mentalhealthmn.org): 146-705-XXYE (6765)  Mental Health Consumer/Survivor Network of [place] (www.mhcsn.net): 908.776.7560 or 489-663-1620  Mental Health Association of [place] (www.mentalhealth.org): 254.719.9655 or 625-752-7629   Substance Abuse and Mental Health Services (www.samhsa.gov)    [facility] ([facility])  [facility] connects people seeking recovery to resources that help foster and sustain long-term recovery.  Whether you are seeking resources for treatment, transportation, housing, job training, education, health care or other pathways to recovery, [facility] is a great place to start.  739.753.9345.  Www.[place]arecovery.org    General Medication Instructions:   See your medication sheet(s) for instructions.   Take all medicines as directed.  Make no changes unless your doctor suggests them.   Go to all your doctor visits.  Be sure to have all your required lab tests. This way, your medicines  "can be refilled on time.  Do not use any drugs not prescribed by your provider.  AA/NA and Sponsors are excellent resources for support  Avoid alcohol.    Please Note:  Please take this discharge folder with you to all your follow up appointments, it contains your lab results, diagnosis, medication list and discharge recommendations.      Pending Results     No orders found from 3/8/2017 to 3/11/2017.            Admission Information     Date & Time Provider Department Dept. Phone    3/10/2017 Stacey Damon MD [facility] [phone]      Your Vitals Were     Blood Pressure Pulse Temperature Respirations Height Weight    130/83 (BP Location: Left arm) 90 96.6  F (35.9  C) 16 1.956 m (6' 5\") 124.7 kg (275 lb)    BMI (Body Mass Index)                   32.61 kg/m2           MyChart Information     [facility] lets you send messages to your doctor, view your test results, renew your prescriptions, schedule appointments and more. To sign up, go to [email or web address] . Click on \"Log in\" on the left side of the screen, which will take you to the Welcome page. Then click on \"Sign up Now\" on the right side of the page.     You will be asked to enter the access code listed below, as well as some personal information. Please follow the directions to create your username and password.     Your access code is: [email or web address]  Expires: 2017  6:07 PM     Your access code will  in 90 days. If you need help or a new code, please call your [facility] clinic or [phone].        Care EveryWhere ID     This is your Care EveryWhere ID. This could be used by other organizations to access your [facility] medical records  [id number]           Review of your medicines      STOP taking     acetaminophen 325 MG tablet   Commonly known as:  TYLENOL           atenolol 25 MG tablet   Commonly known as:  TENORMIN           cyclobenzaprine 10 MG tablet   Commonly known as:  FLEXERIL           folic acid 1 " MG tablet   Commonly known as:  FOLVITE           multivitamin, therapeutic with minerals Tabs tablet           thiamine 100 MG tablet                    Protect others around you: Learn how to safely use, store and throw away your medicines at www.disposemymeds.org.             Medication List: This is a list of all your medications and when to take them. Check marks below indicate your daily home schedule. Keep this list as a reference.      Notice     You have not been prescribed any medications.

## 2017-03-10 NOTE — PROGRESS NOTES
Brief Neurosurgery Consult Note Recommendations (full dictated note to follow, [id number]):  - Upright AP/Lateral X-rays of C-spine in [place] collar  - Wear Aspin collar at all times except when showering when may wear Brook collar.  Please provide patient with extra pads for Aspin collar.    - F/U in Neurosurgery clinic in 6 weeks with our NP with upright AP/Lateral X-rays of C-spine in Aspen collar prior to visit    The patient was discussed with Dr. Early, neurosurgery chief resident, and she agrees with the above.    Bahman Dyer MD,PhD  Neurosurgery PGY-1    Please contact neurosurgery resident on call with questions.    Dial * * [phone], enter [phone] when prompted.

## 2017-03-10 NOTE — PROVIDER NOTIFICATION
Zafar     ETOH withdrawal/ neck pain  S- 1530- Pt stated still having L neck aching down to L shoulder but numbness in L hand imrproved to only tingling in thumb.  Also stated L leg had been having pain.  Pt requested oxycodone but stated pain much improved.    Pt declined to wear his [place] collar.  Jada saw pt- OK to use the aspen collar.  Plan xray neck to check    Pt stated at 1600 still having some withdrawal sypmpt but by 1600 stated  feeling better from ETOH withdrawal and thinking he could go home.  Discussed that he received a lot of valium in am, and BP meds for HTN,  and still getting lyte replacememnt- just hung phos IV.    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  MD does not want pt to leave for same reasons listed above.  Would put pt on 72 hr hold if he attempts to leave.  PT informed.    Pt unhappy but agrees to stay voluntary at this time

## 2017-03-10 NOTE — PLAN OF CARE
Problem: Goal Outcome Summary  Goal: Goal Outcome Summary  Outcome: Improving  Attempted to call report at 1400 and 1445 3 A unable to accept patient. 1715 Report called to Sulema RODRIGUEZ on station 3A. Pt updated on transfer time to 3A. Transportation and security notified and will accompany patient via w/c.

## 2017-03-10 NOTE — PROGRESS NOTES
S: Order received to fit patient with a [place] collar for showering as ordered by Dr. Dyer.  Additionally, requesting an additional pad set for his [place] collar  O/G: Support and stabilize cervical spine  A:  Patient was fit with a universal [place] collar to be used for showering.  The fit of the collar is adequate.  Patient was also provided with an extra set of pads for his [place] collar  P: contact orthotics if any issues arise

## 2017-03-10 NOTE — IP AVS SNAPSHOT
[facility]    [facility] [place] [id number]    Phone:  [phone]                                       After Visit Summary   3/10/2017    Tao Castillo    MRN: [id number]           After Visit Summary Signature Page     I have received my discharge instructions, and my questions have been answered. I have discussed any challenges I see with this plan with the nurse or doctor.    ..........................................................................................................................................  Patient/Patient Representative Signature      ..........................................................................................................................................  Patient Representative Print Name and Relationship to Patient    ..................................................               ................................................  Date                                            Time    ..........................................................................................................................................  Reviewed by Signature/Title    ...................................................              ..............................................  Date                                                            Time
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
## 2017-03-10 NOTE — PROGRESS NOTES
"Social Work Services Progress Note    Hospital Day: 3  Date of Initial Social Work Evaluation:  3/9/17  Collaborated with:  Dr Damon, Dr Church, pt, Maciej 10A RN manager, TAMIKO Dugan nurse    Data:  DC plan    Intervention:  Writer, with Dr Damon and Dr Church, met w/pt and reviewed recommended plan for pt to continue to remain in hospital for continued Detox. Pt does not believe he is experiencing withdrawal, despite having received valium whit morning. Pt states he needs to leave hospital because he needs to take care of his girlfriend's baby \"that is all alone\". Writer left voicemail message with Milagro to verify that child is being taken care of ([phone]).    Dr Damon placed pt on 72 hour hold given his insistence that he DC despite 2 physicians assessing pt as needing to remain hospitalized to continue Detox. Pt is at risk for harm should he leave AMA.      Writer provided pt with 72 hour hold order and his patient's rights under 72 hour hold.  Pt informed medical team of his opinion of 72 hour hold \"This is bullshit\".        Assessment:  pt minimizing alcohol use, minimizing need for continued detox management., Perservates on desire to DC from hospital    Plan:    Anticipated Disposition:  when pt has completed detox and is not in danger of harm, he will DC home w/outpt CD resources    Barriers to d/c plan:  Staffing on Detox Unit not available until after 1600 (anticipate 1630)      Follow Up:  SW con't to follow per request/referral    "

## 2017-03-10 NOTE — PLAN OF CARE
Problem: Goal Outcome Summary  Goal: Goal Outcome Summary  Outcome: Improving  A&Ox4. BP slightly elevated. MSSA scores 9 and 7. 10mg Valium given x1. Recent C6 fracture. Wearing Aspen collar more often after Orthotist visit. Denied pain. Tingling present in left thumb. History of seizures with withdrawal. Seizure pads in place. Up independently in room. Voiding in bathroom independently. Refused shower multiple times. PIV infusing MIVF at 100mL/hour. Pt sleeping between cares. Continue to monitor and notify MDs accordingly.    Pt. Called about PIV. PIV checked clean, dry, intact no sign of infiltration. Pt. Insisted it needed to be removed. Reinforced that IV was needed. Pt. angry and was swearing.  Within an hour charge nurse went in and pt. had already pulled IV out.

## 2017-03-10 NOTE — PLAN OF CARE
Problem: Goal Outcome Summary  Goal: Goal Outcome Summary  Outcome: No Change  Nursing  S- MSSA 8 valium x1 Pt was asking for something for withdrawls , mild syptoms.  Slept.  Tylenol and oxy po x2 for L neck and shoulder aching, states onlyu mild tingling in L thumb.  Ate well, voiding in BR ..  Did put on aspin collar at HS w encouragment  A- stable  R- labs in am,  Monitor for signs withdrawal.  Enc aspin collar

## 2017-03-10 NOTE — PLAN OF CARE
Problem: Goal Outcome Summary  Goal: Goal Outcome Summary  Outcome: No Change  A&Ox4. VSS. Sinus rhythm. MSSA scores 4 and 11. 10mg Valium given x1. Recent C6 fracture. Intermittently wearing Aspen collar. C/o left neck and shoulder pain somewhat managed with 10mg Oxycodone q 3 hours. Tingling present in left thumb. History of seizures with withdrawal. Seizure pads in place. Up independently in room. Voiding in bathroom. PIV infusing D5NS with KCl at 100mL/hour. Pt sleeping between cares. Continue to monitor and notify MDs accordingly.

## 2017-03-10 NOTE — PLAN OF CARE
Problem: Goal Outcome Summary  Goal: Goal Outcome Summary  Outcome: No Change  Pt found in am to be very upset and swearing at staff that he wants to leave hospital. Pt states that he has treatment set up in 2 weeks. Pt vague in his treatment course and state he does not need treatment. Pt threatening to leave hospital. MD in to see patient and told patient that he needed to stay in hospital until he is finished with detox and medically stable. Pt also informed that he needs to see psychiatry and the . Pt angry and stated he already has a psychiatrist that he sees at home. Pt insisting that he needs to leave hospital to care for daughter and get to work. (Pt  is a .)  Pt noncompliant in wearing his  cervical collar. Pt informed by staff and MD that collar needs to stay on for neck fracture.  Pt refused shower or to get cleaned up. Hygiene poor.  Appetite good. MSSA score 9-7. Valium mg given per score of 9. Seizure precautions continued. No seizure activity noted. Pt pulled out IV and will have flyer replace. Dr. Damon in to see patient. Psychiatry will discuss plan with other MD's and update staff. Call light within reach.     1420 Dr. Damon and Dr. Church in to see pt and placed him on hold with  present. Please see social workers note on 72 hour hold of patient and rights given to him. Pt upset and when MSSA done score at 7refusing valium. Pt c/o neck pain. CMS/neuros unchanged with same tingling in left thumb. Radial pulse+. Medicated with oxycodone 10 mg and scheduled flexeril.

## 2017-03-10 NOTE — DISCHARGE SUMMARY
Discharge Summary     Tao Castillo MRN# [id number]   YOB: 1990 Age: 26 year old     Date of Admission:  3/8/2017  Date of Discharge:  3/10/2017  Admitting Physician:  Diony Church MD  Discharge Physician:  Diony Church MD (Contact:[phone])  Discharging Service:  Internal Medicine     Primary Provider: Danny Camejo            Discharge Diagnosis:     Severe alcohol withdrawal delirium (H)    Hypertensive urgency    Cervical spine fracture (H)    SIRS (systemic inflammatory response syndrome) (H)    * No resolved hospital problems. *               Discharge Disposition:   Transferred to  detox           Condition on Discharge:   Discharge condition: Stable   Code status on discharge: Full Code           Procedures:   All laboratory and imaging data in the past 24 hours reviewed          Discharge Medications:     Current Discharge Medication List      START taking these medications    Details   acetaminophen (TYLENOL) 325 MG tablet Take 2 tablets (650 mg) by mouth every 4 hours as needed for mild pain  Qty: 100 tablet    Associated Diagnoses: Closed fracture of sixth cervical vertebra, unspecified fracture morphology, sequela      atenolol (TENORMIN) 25 MG tablet Take 1 tablet (25 mg) by mouth daily  Qty: 30 tablet    Associated Diagnoses: Hypertensive urgency      multivitamin, therapeutic with minerals (THERA-VIT-M) TABS tablet Take 1 tablet by mouth daily  Qty: 30 each, Refills: 0    Associated Diagnoses: Alcohol withdrawal, uncomplicated (H)      cyclobenzaprine (FLEXERIL) 10 MG tablet Take 1 tablet (10 mg) by mouth 3 times daily  Qty: 42 tablet    Associated Diagnoses: Closed fracture of sixth cervical vertebra with routine healing, unspecified fracture morphology, subsequent encounter      thiamine 100 MG tablet Take 1 tablet (100 mg) by mouth daily    Associated Diagnoses: Alcohol withdrawal, uncomplicated (H)      folic acid (FOLVITE) 1 MG tablet Take 1 tablet (1  "mg) by mouth daily  Qty: 30 tablet    Associated Diagnoses: Alcohol withdrawal, uncomplicated (H)                   Consultations:   Consultation during this admission received from Psychiatry              Brief History of Illness: from [facility]    26 year old year old male With h/o HTN, Cerebral aneurysm (s/p clipping), recent Cervical fracture admitted on 3/8/2017 for Detox from alcohol. Says drinks 0.75 L of vodka / day, Has been to previous detox, has previous h/o withdrawal seizures. Currently denies any suicidal ideation. Denies any auditory, tactile or visual hallucinations.           Hospital Course: Problem based    Fever/ SIRS; Better . ( influenza test-neg, CXR-neg, procalcitonin -neg)      ETOH withdrawal  - monitored on telemetry, seizure and fall precautions  - MSSA alcohol withdrawal protocol with valium   - Banana bag with MVT, Thiamine, Folate.      Anxiety: Psych consult- feels contributing to his drinking   Apreciate: transferred to Detox unit for continued cares      C6 fracture: 1 week ago. Persistent LUE numbness and weakness   - NSG consult appreciated.\"1. Upright AP and lateral x-rays of the C-spine with cervical collar in place.   2. Agree with an [place] collar. Recommend the patient wear it continuously with the exception showers during which time he became wear a [place] brace. We also recommend acquisition of extra pads for the Aspen collar as the patient will require continuous collar use for 6 weeks.   3. Follow up in Neurosurgery Clinic in 6 weeks with one of our nurse practitioners. Require AP and lateral cervical spine x-rays prior to the visit.   - need clarification from MRI if clip compatible\"       HTN urgeny: not on meds for a while (apparently was on atenolol 100mg, Losartan 100mg in 2015)   - considering previous history of aneursym and clipping (Right orbital frontal craniotomy for clipping of unruptured anterior communicating artery aneurysm (complex) with intraoperative " angiography. IMPLANTED HARDWARE: #5 Sugita clip.- 12/11) need to be aggressive with BP management .    - started on Atenolol 25 mg QD with improvement             Final Day of Progress before Discharge:     Blood pressure (!) 160/93, pulse 88, temperature 97.3  F (36.3  C), temperature source Oral, resp. rate 20, weight 124.9 kg (275 lb 6 oz), SpO2 97 %.  Neck- supple  CVS- I+II+ no m/r/g  RS- CTAB  Abdo- soft, no tenderness . No g/r/r   Ext- no edema   CNS- no gross focal signs. Oriented to person, place and time   Mild tremor+         Data:  All laboratory data reviewed             Significant Results:   BMP  Recent Labs  Lab 03/10/17  0537 03/09/17  0552 03/08/17  1525   NA  --  133 136   POTASSIUM 4.5 3.6 3.8   CHLORIDE  --  99 103   YOSEPH  --  8.4* 8.8   CO2  --  24 24   BUN  --  11 12   CR  --  0.64* 0.59*   GLC  --  99 77     CBC  Recent Labs  Lab 03/09/17  0552   WBC 10.3   RBC 5.28   HGB 16.1   HCT 46.9   MCV 89   MCH 30.5   MCHC 34.3   RDW 13.1   *     INR  Recent Labs  Lab 03/09/17  0552   INR 1.01     LFTs  Recent Labs  Lab 03/09/17  0552 03/08/17  1525   ALKPHOS 52 60   AST 37 44   ALT 39 51   BILITOTAL 1.0 0.6   PROTTOTAL 7.2 8.3   ALBUMIN 3.3* 3.8      PANCNo lab results found in last 7 days.    Recent Results (from the past 48 hour(s))   Cervical spine CT w/o contrast    Narrative    CT CERVICAL SPINE WITHOUT CONTRAST   3/8/2017 6:28 PM     HISTORY: Left C6 articular process fracture. Recurrent pain.    TECHNIQUE: Axial images of the cervical spine were obtained without  intravenous contrast. Multiplanar reformations were performed.  Radiation dose for this scan was reduced using automated exposure  control, adjustment of the mA and/or kV according to patient size, or  iterative reconstruction technique.     COMPARISON: 3/1/2017.    FINDINGS: Sagittal images demonstrate normal posterior alignment.  Again seen is a nondisplaced fracture through the superior articular  process of the left C6  facet. It is unchanged in position. There is no  evidence for healing at this time. No new fractures are present.    C2-C3: Normal.    C3-C4: Normal.    C4-C5: Normal.    C5-C6: No stenosis. Nondisplaced left superior articular process  fracture noted.    C6-C7: Normal.    C7-T1: Normal.    Paraspinal soft tissues: Unremarkable as visualized. There is no  evidence for epidural hematoma on this study.      Impression    IMPRESSION:  1. No change in nondisplaced left C6 superior articular process  fracture.  2. There is no evidence for any malalignment, stenosis, or epidural  hematoma. If clinical symptoms persist or progress, MRI can be more  sensitive in detecting subtle disc protrusions or subtle hemorrhages.    OTIS ZAPATA MD   XR Chest 2 Views    Narrative    XR CHEST 2 VW   3/9/2017 9:56 AM     HISTORY: Cough.    COMPARISON: 5/14/2011      Impression    IMPRESSION: Negative chest.     CHRISTOPHER MENDEZ MD   XR Cervical Spine 2/3 Views    Narrative    XR CERVICAL SPINE 2/3 VWS3/10/2017 8:42 AM     HISTORY:  AP and lateral views of the C-spine in Cervical collar.    COMPARISON: CT dated 3/8/17    FINDINGS: There is normal bony alignment.  No fractures are  identified.  An aneurysm clip is seen projected over the skull.      Impression    IMPRESSION: Normal alignment. The fracture of the C6 articular process  seen on the recent CT is not identified on this plain film.    GAURAV LUQUE MD                Pending Results:   Unresulted Labs Ordered in the Past 30 Days of this Admission     No orders found from 1/7/2017 to 3/9/2017.                  Discharge Instructions and Follow-Up:     Discharge Procedure Orders  Reason for your hospital stay   Order Comments: Alcohol withdrawal     Follow Up and recommended labs and tests   Order Comments: Follow up with medical provider in Detox unit .  The following labs/tests are recommended: Adjustment of antihypertensives.  Follow up in Neurosurgery Clinic in 6 weeks with one of  our nurse practitioners. Require AP and lateral cervical spine x-rays prior to the visit.     Activity - Up ad candace   Order Specific Question Answer Comments   Is discharge order? Yes      Additional Discharge Instructions   Order Comments: Aspen collar. Recommend the patient wear it continuously with the exception showers during which time he became wear a [place] brace. We also recommend acquisition of extra pads for the Aspen collar as the patient will require continuous collar use for 6 weeks.     Advance Diet as Tolerated   Order Comments: Follow this diet upon discharge: Orders Placed This Encounter     Combination Diet Regular Diet Adult   Order Specific Question Answer Comments   Is discharge order? Yes           Attestation:  Diony Church MD (Pager- [phone])   Internal Medicine/ Hospitalist      Time spent on patient: 45 minutes total including face to face and coordinating care time reviewing current illness, any medication changes, and the care plan for today.  D/W JESSICA, Care coordinator, psychiatry

## 2017-03-11 PROCEDURE — 99221 1ST HOSP IP/OBS SF/LOW 40: CPT | Mod: AI | Performed by: PSYCHIATRY & NEUROLOGY

## 2017-03-11 PROCEDURE — 25000132 ZZH RX MED GY IP 250 OP 250 PS 637: Performed by: PHYSICIAN ASSISTANT

## 2017-03-11 PROCEDURE — 99221 1ST HOSP IP/OBS SF/LOW 40: CPT | Performed by: PHYSICIAN ASSISTANT

## 2017-03-11 PROCEDURE — 25000132 ZZH RX MED GY IP 250 OP 250 PS 637: Performed by: PSYCHIATRY & NEUROLOGY

## 2017-03-11 PROCEDURE — 12800008 ZZH R&B CD ADULT

## 2017-03-11 PROCEDURE — 99207 ZZC CDG-HISTORY COMPONENT: MEETS DETAILED - DOWN CODED LACK OF ROS: CPT | Performed by: PSYCHIATRY & NEUROLOGY

## 2017-03-11 RX ORDER — ATENOLOL 25 MG/1
25 TABLET ORAL DAILY
Status: DISCONTINUED | OUTPATIENT
Start: 2017-03-11 | End: 2017-03-12 | Stop reason: HOSPADM

## 2017-03-11 RX ADMIN — TRAZODONE HYDROCHLORIDE 50 MG: 50 TABLET ORAL at 22:23

## 2017-03-11 RX ADMIN — IBUPROFEN 600 MG: 600 TABLET, FILM COATED ORAL at 20:50

## 2017-03-11 RX ADMIN — FOLIC ACID 1 MG: 1 TABLET ORAL at 08:31

## 2017-03-11 RX ADMIN — TRAZODONE HYDROCHLORIDE 50 MG: 50 TABLET ORAL at 20:50

## 2017-03-11 RX ADMIN — ACETAMINOPHEN 650 MG: 325 TABLET, FILM COATED ORAL at 00:06

## 2017-03-11 RX ADMIN — DIAZEPAM 10 MG: 5 TABLET ORAL at 08:31

## 2017-03-11 RX ADMIN — NICOTINE POLACRILEX 8 MG: 4 GUM, CHEWING ORAL at 15:24

## 2017-03-11 RX ADMIN — IBUPROFEN 600 MG: 600 TABLET, FILM COATED ORAL at 08:31

## 2017-03-11 RX ADMIN — MULTIPLE VITAMINS W/ MINERALS TAB 1 TABLET: TAB at 08:31

## 2017-03-11 RX ADMIN — ATENOLOL 25 MG: 25 TABLET ORAL at 16:25

## 2017-03-11 RX ADMIN — CYCLOBENZAPRINE HYDROCHLORIDE 10 MG: 10 TABLET, FILM COATED ORAL at 22:23

## 2017-03-11 RX ADMIN — ACETAMINOPHEN 650 MG: 325 TABLET, FILM COATED ORAL at 16:26

## 2017-03-11 RX ADMIN — Medication 100 MG: at 08:31

## 2017-03-11 RX ADMIN — DIAZEPAM 10 MG: 5 TABLET ORAL at 00:06

## 2017-03-11 RX ADMIN — TRAZODONE HYDROCHLORIDE 50 MG: 50 TABLET ORAL at 00:06

## 2017-03-11 ASSESSMENT — ACTIVITIES OF DAILY LIVING (ADL)
ORAL_HYGIENE: INDEPENDENT
DRESS: INDEPENDENT
GROOMING: INDEPENDENT
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
## 2017-03-11 NOTE — PLAN OF CARE
"Problem: Substance Withdrawal  Goal: Substance Withdrawal  Signs and symptoms of listed problems will be absent or manageable.   Outcome: No Change  S:  Tao (goes by Jaylon) is a 25 yo M who came to [facility] on a 72 Hour Hold from [facility]. He was admitted there to be detoxed from alcohol and was subsequently seen buy Dr. Damon who initiated the 72 hour hold.  During the interview, he responded when asked if there was anything he could tell me that would help me personalize his care more completely, \"I'm detoxed already\".  He reports that he has been drinking \"3-4 drinks on the week nights (vodka or beer) and 750 ml on the week ends\".  He reports that his last drink was on Tuesday, March 7, 2017 @ 17:00.  During the interview, he had small beads of sweat on his forehead.  He states that he smokes a little marijuana, last use was two weeks ago.  \"I only take one hit\".  He is a 1/2 ppd cigarette smoker, has been since age 14.  He arrived on the unit wearing a nicotine patch.  He has requested to use nicotine gum instead.    He denies any thoughts of self harm, suicide ideation or thoughts of harming others.  He reports having lost many friends to overdoses and one friend through suicide.  He reports that he receives emotional support from his Mom, Dad and his girlfriend.  He lives with her, she has a child whom he sometimes sits with.  He stated that he has a restraining order against him put in place by an ex-girlfriend.  He works full-time as a   B:  Per radiology report: he has a non displaced fracture through the superior articular process of the left C6 facet.  PMH listed in the Epic chart refers to HTN and aneurysm.  A:  Patient in moderate alcohol withdrawal AEB MSSA score of 10.  R:  Obtained admission orders .  Educated on the risks of going through detox without the anti-seizure agent Valium and that he is at increased risk for seizures, given that he has already had one in the past.  Counseled on smoking " cessation.  Provided with a nutritious meal, snacks and provided increased fluids.  Oriented to unit routines and schedule. Introduce to the IM&R Treatment program and the accompanying paperwork requirements.  Administered Valium 10 mg.  Continue to monitor and medicate as indicated and ordered.

## 2017-03-11 NOTE — PLAN OF CARE
Problem: General Plan of Care (Inpatient Behavioral)  Goal: Individualization/Patient Specific Goal (IP Behavioral)  The patient and/or their representative will achieve their patient-specific goals related to the plan of care.    The patient-specific goals include:   Patient will identify reason(s) for hospitalization from their perspective.  Patient will identify a goal for discharge.  Patient will identify triggers that may increase their risk for relapse.  Patient will identify coping skills they can do to stay well.   Patient will identify their support system to demonstrate readiness for discharge.  Illnesses Management & Recovery  Patient identified  as trigger for relapse.  Patient identified  as a general wellness strategy.  Patient identified  as a warning sign that they are in danger of relapse.  Patient identified  as someone they cont on to get feedback from.  Patient identified  as a way to take action when in danger of relapse.  Patient identified  as a way to cope with stress or other symptoms.

## 2017-03-11 NOTE — H&P
DATE OF SERVICE: 3/11/17    IDENTIFYING INFORMATION: Tao Castillo is a 26-year-old  male who is single, does not have any children. He is a . Has a girlfriend.       CHIEF COMPLAINT: Alcoholism      HISTORY OF PRESENT ILLNESS: Patient is a 26 years old male with significant medical history of clipped cerebrovascular aneurysm, alcoholism, alcohol withdrawal seizures, recent C6 spine fracture. He presented to the ED yesterday with left-sided numbing and transferred to unit 3A for alcohol detox due to complicated alcohol withdrawal under 72 hours hold.     The patient states that he began to drink alcohol at the age of 13. By 18, he was in 1st treatment. He says that he was in treatment last year or so. He relapsed in January. He has been drinking a case of beer of 0.75. He has tolerance to alcohol, withdrawal including one seizure in the past, progressive use. Used despite negative consequences, strained family relationships. He apparently was prescribed pain pills, but ran out of them and began to drink to manage pain. He denies any depression, denies any anxiety, denies any suicidal ideation. Denied any auditory or visual hallucinations.       PAST PSYCHIATRIC HISTORY: He was in [facility]. Never psychiatrically hospitalized.       MEDICAL HISTORY: He has a history of C6 fracture, hypertension. He has hardware implanted.     FAMILY HISTORY: Denied any family psychiatric or chemical dependency issues.       SOCIAL HISTORY: Born in [place], good childhood, no abuse.       Allergies   Allergen Reactions     Shrimp Swelling     [place] Flavor      Nkda [No Known Drug Allergies]      Pecan [Nuts] Swelling     Current Facility-Administered Medications   Medication     diazepam (VALIUM) tablet 5-20 mg     atenolol (TENORMIN) tablet 50 mg     thiamine tablet 100 mg     folic acid (FOLVITE) tablet 1 mg     multivitamin, therapeutic with minerals (THERA-VIT-M) tablet 1 tablet      hydrOXYzine (ATARAX) tablet 25-50 mg     acetaminophen (TYLENOL) tablet 650 mg     alum & mag hydroxide-simethicone (MYLANTA ES/MAALOX  ES) suspension 30 mL     magnesium hydroxide (MILK OF MAGNESIA) suspension 30 mL     bisacodyl (DULCOLAX) Suppository 10 mg     traZODone (DESYREL) tablet 50 mg     nicotine polacrilex (NICORETTE) gum 4-8 mg     ondansetron (ZOFRAN-ODT) ODT tab 4 mg     ibuprofen (ADVIL/MOTRIN) tablet 600 mg     cyclobenzaprine (FLEXERIL) tablet 10 mg     MENTAL STATUS EXAMINATION: The patient is a 26-year-old  male who appears malodorous with poor grooming, poor hygiene, poor eye contact. Mood is irritable but denies feeling depressed. Affect is mood congruent. Speech is less spontaneous, normal in rate/volume. Less logical in thinking. No looseness of association, judgment and insight are limited. Does not have any suicidal or homicidal ideation. Does not have auditory or visual hallucinations. Alert, oriented x3. Recent and remote memory, language, fund of knowledge are all adequate.       DIAGNOSIS   Alcohol use disorder    RECOMMENDATIONS:   1. The patient will be detoxed off alcohol using MSSA protocol on Valium.  2. Anticipate discharge tomorrow or on Monday depending on his MSSA scores.

## 2017-03-11 NOTE — PROGRESS NOTES
Writer checked in with pt to discuss aftercare plans, pt was in bed and visibly in withdrawal. Pt reports that he attends [facility] and is scheduled to begin treatment program at [facility]. Reports he is court ordered for treatment as well as psychiatry and therapy. Declines further CM at this time.    Writer will follow-up to gather more details when pt is feeling better.

## 2017-03-11 NOTE — H&P
"REASON FOR CONSULTATION:  Alcoholism.      REQUESTING PHYSICIAN:  Dr. Church.      IDENTIFYING INFORMATION:  Tao Castillo is a 26-year-old  male who is single, does not have any children.  He is a .  Has a girlfriend.      CHIEF COMPLAINT:  \"I came here for my neck      HISTORY OF PRESENT ILLNESS:  The patient has presented to the Emergency Room.  He has a significant medical history of clipped cerebrovascular aneurysm, alcoholism, alcohol withdrawal seizures, recent C6 spine fracture.  He presented with left-sided numbing.  The patient is a poor historian, quite irritable.  The patient says that he began to drink alcohol at the age of 13.  By 18, he was in 1st treatment.  He says that he was in treatment last year or so.  He is not able to tell me, but he relapsed in January.  He has been drinking a case of beer of 0.75.  He has tolerance to alcohol, withdrawal including seizures, progressive use.  Used despite negative consequences, strained family relationships.  He apparently was prescribed pain pills, but ran out of them and began to drink to more  He denies any depression, denies any anxiety, denies any suicidal ideation.  Denied any auditory or visual hallucinations.  He was seen by Neurosurgery and they recommended that patient continue wearing the [place] collar all the time.  Especially when showering he may wear neck collar and to follow up with them.  Unfortunately when this happened, he denied been drinking, and when he presented to the Emergency Room, they did not do an alcohol level on him.      PAST PSYCHIATRIC HISTORY:  He was in [facility].  Never psychiatrically hospitalized.      MEDICAL HISTORY:  He has a history of C6 fracture, hypertension, .  He has hardware implanted.        VITAL SIGNS:  Temperature of 97.3, pulse of 88, respiratory rate of 20, blood pressure 160/93.      FAMILY HISTORY:  Denied any family psychiatric or chemical dependency issues.    "   SOCIAL HISTORY:  Born in [place], good childhood, no abuse.      MENTAL STATUS EXAMINATION:  The patient is a 26-year-old  male who appears malodorous with poor grooming, poor hygiene, poor eye contact.  Mood is irritable.  Affect is congruent.  Speech is less spontaneous, reduced in rate/volume.  Less logical in thinking.  no looseness of association, judgment /insight limited.  Does not have any suicidal or homicidal ideation.  Does not have auditory or visual hallucinations.  Alert, oriented x3.  Recent and remote memory, language, fund of knowledge are all adequate.      DIAGNOSIS     Axis I:  Alcohol dependence.      RECOMMENDATIONS:   1.  Medical stabilization, Internal Medicine.   2.  Continue detox off alcohol using MSSA protocol and Valium.        During his hospital stay, the patient removed his IV fluids and was threatening to leave.  He was placed on a 72-hour Hold and will be transferred to inpatient Psych for further care.  The patient does not have any suicidal or homicidal ideation, plan or intent at this time.  He will be placed on a 72-hour Hold because he has a history of complicated alcohol withdrawal and also has medical complications of aneurysm repair .  With  akcohol withdrawal, it can be complicated.  These recommendations were given to Dr. Church.  The patient placed on a Hold.  The patient was informed about hold and complications of alochol withdrawl   The patient transferred to inpatient 3A detox to complete detox.         PATRICK CANAS MD             D: 03/10/2017 15:10   T: 03/10/2017 15:50   MT: QUINN      Name:     DAWIT BLOOD   MRN:      [id number]        Account:      [id number]   :      1990           Admitted:     [id number]      Document: [id number]       cc: Diony Church MD

## 2017-03-11 NOTE — CONSULTS
"  Internal Medicine Initial Visit    Tao Castillo MRN# [id number]   Age: 26 year old YOB: 1990   Date of Admission: 3/10/2017     Reason for consult: HTN, C6 Fracture       Requesting physician Dr. Ld Early      SUBJECTIVE   HPI:   Tao Castillo is a 26 year old male with history of HTN, C6 fracture, anterior communicating artery aneurysm (s/p clipping), alcohol dependence, and polysubstance abuse admitted to station 3A for alcohol withdrawal. Medicine was consulted to evaluate patient's HTN, recent C6 fracture, and hx of aneurysm.    Patient initially hospitalized 3/9 for fracture of C6, then transferred to station 3A for further alcohol detoxification. Reports last drink on 3/7.    Patient was initially admitted to Internal Medicine for C6 fracture and SIRS/concern for possible infection. Neurosurgery was consulted and recommended patient remain in [place] collar and follow up in 6 weeks. No source of infection was identified, and patient was stable to transfer to detox on 3/10. Reports sustaining fracture while wrestling with a friend. Currently patient endorses fatigue, otherwise no withdrawal symptoms. Denies fevers, chills, HA, cough, SOB, chest pain, abdominal pain, nausea, vomiting, dysuria, diarrhea, constipation, or peripheral edema. Notes his neck pain is \"tolerable\", no numbness or weakness.     Past Medical History:     Past Medical History   Diagnosis Date     Alcohol dependence (H)      Aneurysm (H) 5/11     [facility] sees neuro Dr. Latham     BMI 34.0-34.9,adult      CARDIOVASCULAR SCREENING; LDL GOAL LESS THAN 160      HTN (hypertension)      Marijuana dependence (H)      Polysubstance abuse       Reviewed & updated in [employer].     Past Surgical History:      Past Surgical History   Procedure Laterality Date     Craniotomy  5/11     rt frontal orbital-clipping of acom aneurysm      Reviewed & updated in Epic.     Medications prior to admission:     Prior to Admission Medications " "  Prescriptions Last Dose Informant Patient Reported? Taking?   acetaminophen (TYLENOL) 325 MG tablet   No No   Sig: Take 2 tablets (650 mg) by mouth every 4 hours as needed for mild pain   atenolol (TENORMIN) 25 MG tablet   No No   Sig: Take 1 tablet (25 mg) by mouth daily   cyclobenzaprine (FLEXERIL) 10 MG tablet   No No   Sig: Take 1 tablet (10 mg) by mouth 3 times daily   folic acid (FOLVITE) 1 MG tablet   No No   Sig: Take 1 tablet (1 mg) by mouth daily   multivitamin, therapeutic with minerals (THERA-VIT-M) TABS tablet   No No   Sig: Take 1 tablet by mouth daily   thiamine 100 MG tablet   No No   Sig: Take 1 tablet (100 mg) by mouth daily      Facility-Administered Medications: None         Allergies:     Allergies   Allergen Reactions     Shrimp Swelling     [place] Flavor      Nkda [No Known Drug Allergies]      Pecan [Nuts] Swelling         Social History:   Tobacco Use: smokes 1/2 ppd  Alcohol Use: 4-6 beers/day, last drink on 3/7. Hx of 1 withdrawal seizure 2 years ago.  Illicit Drug Use: Denies  STI Testing: Declines at this time     Family History:     Family History   Problem Relation Age of Onset     Asthma Father       Reviewed & updated in Epic.     Review of Systems:   Ten point review of systems negative except as stated above in History of Present Illness.    OBJECTIVE   Physical Exam:   Blood pressure (!) 138/92, pulse 90, temperature 96.8  F (36  C), temperature source Oral, resp. rate 20, height 1.956 m (6' 5\"), weight 124.7 kg (275 lb).  General:  male laying comfortably in bed, neck collar in place. NAD.  HEENT: NC/AT. Anicteric sclera. Eyes symmetric and free of discharge bilaterally. Mucous membranes moist.  Neck: Supple  Cardiovascular: RRR. S1/S2. No murmurs appreciated.  Lungs: Normal respiratory effort on RA. Lungs CTAB without wheezes, rales, or rhonchi.  Abdomen: Soft, non-tender, and nondistended with normoactive bowel sounds.  Extremities: Warm & well perfused. No peripheral " edema.  Skin: No rashes, jaundice, or lesions on exposed areas of skin.  Neurologic: A&O X 3. Answers questions appropriately. Moves all extremities symmetrically.     Laboratory Data:   CMP  Recent Labs  Lab 03/10/17  0537 03/09/17  0552 03/08/17  1525   NA  --  133 136   POTASSIUM 4.5 3.6 3.8   CHLORIDE  --  99 103   CO2  --  24 24   ANIONGAP  --  10 9   GLC  --  99 77   BUN  --  11 12   CR  --  0.64* 0.59*   GFRESTIMATED  --  >90Non  GFR Calc >90Non  GFR Calc   GFRESTBLACK  --  >90African American GFR Calc >90African American GFR Calc   YOSEPH  --  8.4* 8.8   MAG 2.1 1.6 2.1   PHOS 3.6 2.2* 3.5   PROTTOTAL  --  7.2 8.3   ALBUMIN  --  3.3* 3.8   BILITOTAL  --  1.0 0.6   ALKPHOS  --  52 60   AST  --  37 44   ALT  --  39 51       CBC  Recent Labs  Lab 03/09/17  0552   WBC 10.3   RBC 5.28   HGB 16.1   HCT 46.9   MCV 89   MCH 30.5   MCHC 34.3   RDW 13.1   *       TSHNo lab results found in last 7 days.       Assessment and Plan/Recommendations:   Tao Castillo is a 26 year old male with history of HTN, C6 fracture, anterior communicating artery aneurysm (s/p clipping), alcohol dependence, and polysubstance abuse admitted to station 3A for alcohol withdrawal. Medicine was consulted to evaluate patient's HTN and recent C6 fracture.    Alcohol Dependence. ~4-6 beers/day, last drink on 3/7. Hx of withdrawal seizure 2 years ago, no hallucinations. MSSA fluctuating, most recently scoring 8; has received 20 mg of valium today.  - Management per Psychiatry    C6 Fracture. Injury occurred 3/1 while wrestling. CT Spine 3/8 with normal posterior alignment; non-displaced fracture through superior articular process of left C6 facet, unchanged position. XR C-spine 3/10 with normal bony alignment, fx of C6 articular process not identified. Patient reports pain as tolerable, no numbness or weakness.  - Follow up with Neurosurgery Clinic in 6 weeks with repeat AP/lateral C-spine XR  - Continue  Aspen collar x 6 weeks  - Tylenol/Ibuprofen PRN for pain relief    HTN, Hx of aneurysm. Hx of anterior communicating artery aneurysm, s/p clipping ~6 years ago. Started on atenolol during recent hospitalization due to HTN urgency. BP's ranging 120-170's/'s since admission to . Likely element of acute withdrawal. Given hx of aneurysm, should utilize aggressive BP management.   - Continue atenolol 25 mg QD  - Hydralazine PRN    Medicine will continue to follow BP's. Please page the Internal Medicine job code pager for any intercurrent medical issues which arise. Thank you for the opportunity to be a part of this patient's care.    Adrianna Hernandez PA-C  Hospitalist Service  [phone]

## 2017-03-12 VITALS
WEIGHT: 275 LBS | HEART RATE: 90 BPM | RESPIRATION RATE: 16 BRPM | BODY MASS INDEX: 32.47 KG/M2 | SYSTOLIC BLOOD PRESSURE: 130 MMHG | TEMPERATURE: 96.6 F | HEIGHT: 77 IN | DIASTOLIC BLOOD PRESSURE: 83 MMHG

## 2017-03-12 PROCEDURE — 99238 HOSP IP/OBS DSCHRG MGMT 30/<: CPT | Performed by: PSYCHIATRY & NEUROLOGY

## 2017-03-12 PROCEDURE — 25000132 ZZH RX MED GY IP 250 OP 250 PS 637: Performed by: PHYSICIAN ASSISTANT

## 2017-03-12 PROCEDURE — 25000132 ZZH RX MED GY IP 250 OP 250 PS 637: Performed by: PSYCHIATRY & NEUROLOGY

## 2017-03-12 RX ADMIN — IBUPROFEN 600 MG: 600 TABLET, FILM COATED ORAL at 12:28

## 2017-03-12 RX ADMIN — MULTIPLE VITAMINS W/ MINERALS TAB 1 TABLET: TAB at 12:28

## 2017-03-12 RX ADMIN — ATENOLOL 25 MG: 25 TABLET ORAL at 12:27

## 2017-03-12 RX ADMIN — FOLIC ACID 1 MG: 1 TABLET ORAL at 12:27

## 2017-03-12 RX ADMIN — Medication 100 MG: at 12:27

## 2017-03-12 NOTE — PROGRESS NOTES
Pt requesting discharge. He states he does not take any home medications and does not require  discharge medications at this time.

## 2017-03-12 NOTE — DISCHARGE SUMMARY
DATE OF SERVICE: 3/12/17    REASON FOR ADMISSION: Alcoholism    HPI: Patient is a 26 years old male with significant medical history of clipped cerebrovascular aneurysm, alcoholism, alcohol withdrawal seizures, recent C6 spine fracture. He presented to the ED yesterday with left-sided numbing and transferred to unit 3A for alcohol detox due to complicated alcohol withdrawal under 72 hours hold. The patient states that he began to drink alcohol at the age of 13. By 18, he was in 1st treatment. He says that he was in treatment last year or so. He relapsed in January. He has been drinking a case of beer of 0.75. He has tolerance to alcohol, withdrawal including one seizure in the past, progressive use. Used despite negative consequences, strained family relationships. He apparently was prescribed pain pills, but ran out of them and began to drink to manage pain. He denies any depression, denies any anxiety, denies any suicidal ideation. Denied any auditory or visual hallucinations.     HOSPITAL COURSE: The patient was detoxed off alcohol using MSSA protocol on Valium. He requested to be discharged on 3/12/17. He denied any suicidal or homicidal thinking. His thinking was clear and he was deemed capable of making his medical decisions. Mood was euthymic and he denied having any anxiety. There were no cognitive, perceptual or thought disturbances. He was out of withdrawal for over 24 hours. He had follow-up lined up for chemical dependency treatment after discharge. He acknowledged having motivation to quit using alcohol for good. That being, patient was deemed stable for discharge on 3/12/17.     DISCHARGE MEDICATIONS: None    CONDITION ON DISCHARGE: Stable and improved.    FOLLOW UP: Pt reports that he attends [facility] and is scheduled to begin treatment program at [facility]. Reports he is court ordered for treatment.    Ld Early MD  Psychiatry

## 2017-03-12 NOTE — PROGRESS NOTES
Pt verbalized complete understanding of all discharge instructions. Pt discharged to home transported by friend.

## 2017-03-12 NOTE — PROGRESS NOTES
[facility]  [address]  [facility] [address]  [phone]  [phone]      3/12/2017    Re: Tao Martinez Castillo      TO WHOM IT MAY CONCERN:    Tao Castillo was seen at our hospital on 3/8/2017.  Please excuse him from work until 3/20/17 due to injury.        Sincerely,         Adrianna Hernandez PA-C  [facility]  Hospitalist Service

## 2017-03-12 NOTE — PROVIDER NOTIFICATION
S:  Jaylon has had some elevated blood pressure readings this evening shift.  At 16:00, his BP was 140/95.  For the 20:00 reading his BP was 160/103.  A repeat check of his BP ~twenty minutes later was 143/102.  Pt also c/o toothache pain.  He was not wearing his neck brace/immobilizer.  B:  Pt admitted for alcohol withdrawal/detoxification, h/o HTN.  A:  Patient with mild to moderately elevated BP readings in setting of mild alcohol withdrawal AEB MSSA scores of 7 & 5.  R:  Dr. Wilbur Hackett axel informed of elevated BP readings, he advised writer to continue monitoring, no new orders received.  Administered ibuprofen 600 mg for pain.  Writer advised pt to wear neck brace/immobilizer.

## 2017-03-12 NOTE — PROGRESS NOTES
Brief Medicine Note    Medicine following for elevated BP's.    BP readings have improved today, currently 130/83. Intermittently elevated yesterday with a max of 166/100. No new recommendations at this time, will continue to monitor.    Medicine will continue to follow BP's. Please page the internal medicine job code pager with any intercurrent medical issues that arise.    Adrianna Hernandez PA-C  Hospitalist Service  [phone]    Addendum:    Patient is planning to discharge today. BP's have improved since admission. Recommend continuing atenolol 25 mg QD upon discharge. Follow up with PCP within 1 week to recheck BP. Follow up with neurosurgery in 6 weeks for repeat C-spine X-ray. Medicine will sign off at this time.

## 2017-03-12 NOTE — DISCHARGE INSTRUCTIONS
Behavioral Discharge Planning and Instructions  THANK YOU FOR CHOOSING THE [facility]  3AW  [phone]    Summary: You were admitted to Station 3A on 3/10/2017 for alcohol dependence   You are being discharged to home. You have declined case management.  Please take care and make your recovery a priority.    Recommendation: Please contact [facility] intake at [phone] if unable to maintain sobriety and in need of increased level of care.    Main Diagnosis:  Alcohol dependence    Major Treatments, Procedures and Findings:  You received medical treatment for the symptoms of alcohol withdrawal. A medical exam was performed that included lab work. You have met with a .       Symptoms to Report:  If you experience more anxiety, confusion, sleeplessness, deep sadness or thoughts of suicide, notify your treatment team or notify your primary care physician. IF ANY OF THE SYMPTOMS YOU ARE EXPERIENCING ARE A MEDICAL EMERGENCY CALL 911 IMMEDIATELY.     Lifestyle Adjustment: Adjust your lifestyle to get enough sleep, relaxation, exercise and  good nutrition. Continue to develop healthy coping skills to decrease stress and promote a sober living environment. Do not use alcohol, illegal drugs or addictive medications other than what is currently prescribed. AA, NA, and  Sponsor are good resources for support.     Primary Provider: Pt states he does not have a PMD. Pt encouraged to establish a clinic relationship for follow up care.      Resources:     [facility] [phone]    Support Group:  AA/NA and Sponsor/support    Crisis Intervention: 350.915.3544 or 377-500-4038 (TTY: 194.386.5725).  Call anytime for help.  [place] on Mental Illness (www.mn.[place].org): 606.254.4339 or 570-865-8392.  Alcoholics Anonymous (www.alcoholics-anonymous.org): Check your phone book for your local chapter.  Suicide Awareness Voices of Education (SAVE) (www.save.org): 964-001-SAVE  (6764)  National Suicide Prevention Line (www.mentalhealthmn.org): 662-716-MWQU (2579)  Mental Health Consumer/Survivor Network of [place] (www.mhcsn.net): 405.561.2680 or 558-118-2845  Mental Health Association of [place] (www.mentalhealth.org): 433.418.7597 or 905-368-3360   Substance Abuse and Mental Health Services (www.samhsa.gov)    [facility] ([facility])  [facility] connects people seeking recovery to resources that help foster and sustain long-term recovery.  Whether you are seeking resources for treatment, transportation, housing, job training, education, health care or other pathways to recovery, [facility] is a great place to start.  892.346.5674.  Www.[facility].org    General Medication Instructions:   See your medication sheet(s) for instructions.   Take all medicines as directed.  Make no changes unless your doctor suggests them.   Go to all your doctor visits.  Be sure to have all your required lab tests. This way, your medicines can be refilled on time.  Do not use any drugs not prescribed by your provider.  AA/NA and Sponsors are excellent resources for support  Avoid alcohol.    Please Note:  Please take this discharge folder with you to all your follow up appointments, it contains your lab results, diagnosis, medication list and discharge recommendations.

## 2017-03-12 NOTE — PLAN OF CARE
"Problem: Substance Withdrawal  Goal: Substance Withdrawal  Signs and symptoms of listed problems will be absent or manageable.         Pt meets all criteria to be removed from MSSA monitoring. Per unit protocol. Pt now \"out of detox\".          "

## 2017-03-20 DIAGNOSIS — S12.9XXA CERVICAL SPINE FRACTURE (H): Primary | ICD-10-CM

## 2019-10-04 ENCOUNTER — HEALTH MAINTENANCE LETTER (OUTPATIENT)
Age: 29
End: 2019-10-04

## 2020-11-08 ENCOUNTER — HEALTH MAINTENANCE LETTER (OUTPATIENT)
Age: 30
End: 2020-11-08

## 2021-09-11 ENCOUNTER — HEALTH MAINTENANCE LETTER (OUTPATIENT)
Age: 31
End: 2021-09-11

## 2022-01-01 ENCOUNTER — HEALTH MAINTENANCE LETTER (OUTPATIENT)
Age: 32
End: 2022-01-01

## 2022-07-11 ENCOUNTER — OFFICE VISIT (OUTPATIENT)
Dept: URGENT CARE | Facility: URGENT CARE | Age: 32
End: 2022-07-11
Payer: OTHER MISCELLANEOUS

## 2022-07-11 ENCOUNTER — ANCILLARY PROCEDURE (OUTPATIENT)
Dept: GENERAL RADIOLOGY | Facility: CLINIC | Age: 32
End: 2022-07-11
Attending: PHYSICIAN ASSISTANT
Payer: COMMERCIAL

## 2022-07-11 VITALS
SYSTOLIC BLOOD PRESSURE: 130 MMHG | RESPIRATION RATE: 20 BRPM | WEIGHT: 260 LBS | OXYGEN SATURATION: 98 % | DIASTOLIC BLOOD PRESSURE: 77 MMHG | HEART RATE: 53 BPM | TEMPERATURE: 98.9 F | BODY MASS INDEX: 30.83 KG/M2

## 2022-07-11 DIAGNOSIS — S99.922A TOE INJURY, LEFT, INITIAL ENCOUNTER: ICD-10-CM

## 2022-07-11 DIAGNOSIS — S99.922A TOE INJURY, LEFT, INITIAL ENCOUNTER: Primary | ICD-10-CM

## 2022-07-11 DIAGNOSIS — S86.912A MUSCLE STRAIN OF LEFT LOWER LEG, INITIAL ENCOUNTER: ICD-10-CM

## 2022-07-11 PROCEDURE — 99203 OFFICE O/P NEW LOW 30 MIN: CPT | Performed by: PHYSICIAN ASSISTANT

## 2022-07-11 PROCEDURE — 73660 X-RAY EXAM OF TOE(S): CPT | Mod: TC | Performed by: RADIOLOGY

## 2022-07-11 RX ORDER — IBUPROFEN 800 MG/1
800 TABLET, FILM COATED ORAL EVERY 8 HOURS PRN
Qty: 30 TABLET | Refills: 0 | Status: SHIPPED | OUTPATIENT
Start: 2022-07-11 | End: 2023-07-11

## 2022-07-11 NOTE — PROGRESS NOTES
"  Assessment & Plan     Toe injury, left, initial encounter    Toe xray Negative for acute findings, read by Manuel JOHNSON at time of visit.  RICE treatment: Rest, Ice, compression, elevation   Motrin for inflammation and toe pain  Wear flat sole shoe prn    - XR Toe Left G/E 2 Views; Future  - ibuprofen (ADVIL/MOTRIN) 800 MG tablet; Take 1 tablet (800 mg) by mouth every 8 hours as needed    Muscle strain of left lower leg, initial encounter    ROM exercises  Motrin for inflammation and pain    - ibuprofen (ADVIL/MOTRIN) 800 MG tablet; Take 1 tablet (800 mg) by mouth every 8 hours as needed      Tobacco Cessation:   reports that he has been smoking cigarettes. He has a 2.50 pack-year smoking history. He has never used smokeless tobacco.      At today's visit with Tao Castillo , we discussed results, diagnosis, medications and formulated a plan.  We also discussed red flags for immediate return to clinic/ER, as well as indications for follow up if no improvement. Patient understood and agreed to plan. Tao Castillo was discharged with stable vitals and has no further questions.       No follow-ups on file.    Manuel Maza, [facility], PA-C  [facility] URGENT CARE [facility]   Tao is a 32 year old, presenting for the following health issues:  Musculoskeletal Problem (Was at work this morning and \"stepped wrong\" on a step and  injuring Left Great toe)      HPI     Tao Castillo, 32 year old, male presents to the urgent care today with:   Musculoskeletal Problem (Was at work this morning and \"stepped wrong\" on a step and  injuring Left Great toe)      Review of Systems   Constitutional, HEENT, cardiovascular, pulmonary, gi and gu systems are negative, except as otherwise noted.      Objective    /77   Pulse 53   Temp 98.9  F (37.2  C)   Resp 20   Wt 117.9 kg (260 lb)   SpO2 98%   BMI 30.83 kg/m    Body mass index is 30.83 kg/m .  Physical Exam   GENERAL: healthy, alert " and no distress  MS: Positive for right MTP joint tenderness, localized swelling  SKIN: no suspicious lesions or rashes  NEURO: Normal strength and tone, mentation intact and speech normal  PSYCH: mentation appears normal, affect normal/bright                    .  ..

## 2022-07-11 NOTE — LETTER
[facility] CARE  [address] [address]  Phone: [phone]    July 11, 2022      RE: Tao Castillo  [address] [place] [id number]       To whom it may concern:    Tao Castillo was seen in our clinic today. He  may return to work on 7/13/22.    Sincerely,      Manuel Maza PA-C

## 2022-10-30 ENCOUNTER — HEALTH MAINTENANCE LETTER (OUTPATIENT)
Age: 32
End: 2022-10-30

## 2023-04-08 ENCOUNTER — HEALTH MAINTENANCE LETTER (OUTPATIENT)
Age: 33
End: 2023-04-08

## 2024-06-09 ENCOUNTER — HEALTH MAINTENANCE LETTER (OUTPATIENT)
Age: 34
End: 2024-06-09

## 2025-06-15 ENCOUNTER — HEALTH MAINTENANCE LETTER (OUTPATIENT)
Age: 35
End: 2025-06-15

## 2025-08-01 ENCOUNTER — OFFICE VISIT (OUTPATIENT)
Dept: FAMILY MEDICINE | Facility: CLINIC | Age: 35
End: 2025-08-01

## 2025-08-01 VITALS
HEART RATE: 52 BPM | DIASTOLIC BLOOD PRESSURE: 68 MMHG | HEIGHT: 77 IN | RESPIRATION RATE: 21 BRPM | OXYGEN SATURATION: 98 % | BODY MASS INDEX: 26.8 KG/M2 | WEIGHT: 227 LBS | SYSTOLIC BLOOD PRESSURE: 110 MMHG | TEMPERATURE: 97.6 F

## 2025-08-01 DIAGNOSIS — F90.9 ATTENTION DEFICIT HYPERACTIVITY DISORDER (ADHD), UNSPECIFIED ADHD TYPE: Primary | ICD-10-CM

## 2025-08-01 PROBLEM — E87.29 HIGH ANION GAP METABOLIC ACIDOSIS: Status: RESOLVED | Noted: 2017-08-14 | Resolved: 2025-08-01

## 2025-08-01 PROBLEM — E87.1 HYPONATREMIA: Status: RESOLVED | Noted: 2017-08-14 | Resolved: 2025-08-01

## 2025-08-01 PROBLEM — I16.0 HYPERTENSIVE URGENCY: Status: RESOLVED | Noted: 2017-03-10 | Resolved: 2025-08-01

## 2025-08-01 PROBLEM — F10.931 SEVERE ALCOHOL WITHDRAWAL DELIRIUM (H): Status: RESOLVED | Noted: 2017-03-08 | Resolved: 2025-08-01

## 2025-08-01 PROBLEM — S12.9XXA CERVICAL SPINE FRACTURE (H): Status: RESOLVED | Noted: 2017-03-10 | Resolved: 2025-08-01

## 2025-08-01 PROCEDURE — 99203 OFFICE O/P NEW LOW 30 MIN: CPT | Performed by: PHYSICIAN ASSISTANT

## 2025-08-01 RX ORDER — LISINOPRIL 40 MG/1
40 TABLET ORAL DAILY
COMMUNITY
End: 2025-08-01

## 2025-08-04 ENCOUNTER — PATIENT OUTREACH (OUTPATIENT)
Dept: CARE COORDINATION | Facility: CLINIC | Age: 35
End: 2025-08-04